# Patient Record
Sex: MALE | Race: WHITE
[De-identification: names, ages, dates, MRNs, and addresses within clinical notes are randomized per-mention and may not be internally consistent; named-entity substitution may affect disease eponyms.]

---

## 2018-11-13 ENCOUNTER — HOSPITAL ENCOUNTER (INPATIENT)
Dept: HOSPITAL 74 - JSAMEDAYSX | Age: 48
LOS: 8 days | Discharge: HOME | DRG: 454 | End: 2018-11-21
Attending: NURSE PRACTITIONER | Admitting: NEUROLOGICAL SURGERY
Payer: COMMERCIAL

## 2018-11-13 VITALS — BODY MASS INDEX: 41.5 KG/M2

## 2018-11-13 DIAGNOSIS — K59.03: ICD-10-CM

## 2018-11-13 DIAGNOSIS — M25.511: ICD-10-CM

## 2018-11-13 DIAGNOSIS — M54.9: ICD-10-CM

## 2018-11-13 DIAGNOSIS — E66.01: ICD-10-CM

## 2018-11-13 DIAGNOSIS — M40.204: Primary | ICD-10-CM

## 2018-11-13 DIAGNOSIS — M48.04: ICD-10-CM

## 2018-11-13 DIAGNOSIS — G89.29: ICD-10-CM

## 2018-11-13 DIAGNOSIS — T40.2X5A: ICD-10-CM

## 2018-11-13 DIAGNOSIS — M47.14: ICD-10-CM

## 2018-11-13 DIAGNOSIS — M47.814: ICD-10-CM

## 2018-11-13 RX ADMIN — LORAZEPAM PRN MG: 2 INJECTION INTRAMUSCULAR; INTRAVENOUS at 16:35

## 2018-11-13 RX ADMIN — DOCUSATE SODIUM SCH MG: 100 CAPSULE, LIQUID FILLED ORAL at 22:56

## 2018-11-13 RX ADMIN — HEPARIN SODIUM SCH UNIT: 5000 INJECTION, SOLUTION INTRAVENOUS; SUBCUTANEOUS at 22:56

## 2018-11-13 RX ADMIN — HYDROMORPHONE HYDROCHLORIDE SCH MG: 10 INJECTION, SOLUTION INTRAMUSCULAR; INTRAVENOUS; SUBCUTANEOUS at 17:00

## 2018-11-13 RX ADMIN — LORAZEPAM PRN MG: 2 INJECTION INTRAMUSCULAR; INTRAVENOUS at 16:45

## 2018-11-13 NOTE — HP
Language line was used for the following H&P #[id number]





CHIEF COMPLAINT:  Lumbar back pain, Right LE pain and paresthesias/weakness.





PCP: Jorge Daniels





HISTORY OF PRESENT ILLNESS: 47yo male c/o Chronic progressive back pain x 10 

years presents for elective surgery  Patient had thoracic (T3-T5) and lumbar (L2

-L5) fusions @ 8 years ago with Dr. Yasir Perea and was in relatively good 

health but he works in construction and states he has had chronic problems with 

his right leg that has been progressive over the years. He describes episodes 

of intermittent paresthesias and worsening weakness resulting in falls. He 

denies any bladder of bowel dysfunction.








Recent Travel: none





PAST MEDICAL HISTORY: none reported





PAST SURGICAL HISTORY:


T3-T 5 Fusion


L2-L5 Fusion





Social History:


Smoking:none


Alcohol:social


Drugs: none








Allergies





No Known Allergies Allergy (Verified 11/13/18 06:31)


 








HOME MEDICATIONS:


 Home Medications











 Medication  Instructions  Recorded


 


NK [No Known Home Medication]  11/08/18








REVIEW OF SYSTEMS


CONSTITUTIONAL: 


Absent:  fever, chills, diaphoresis, generalized weakness, malaise, 


HEENT: 


Absent:  rhinorrhea, nasal congestion, throat pain, throat swelling, difficulty 

swallowing, 


CARDIOVASCULAR: 


Absent: chest pain, syncope, palpitations, irregular heart rate,


RESPIRATORY: 


Absent: cough, shortness of breath, dyspnea with exertion, orthopnea, 


GASTROINTESTINAL:


Absent: abdominal pain, abdominal distension, nausea, vomiting, diarrhea, 

constipation,


GENITOURINARY: 


Absent: dysuria, frequency, urgency, hesitancy


MUSCULOSKELETAL: 


Absent: myalgia, arthralgia, joint swelling, 


SKIN: 


Absent: rash, itching, pallor


HEMATOLOGIC/IMMUNOLOGIC: 


Absent: easy bleeding, easy bruising, 


ENDOCRINE:


Absent:  unexplained weight loss, heat intolerance, cold intolerance


NEUROLOGIC: 


Absent: headache, + Right LE weakness and  paresthesias, dizziness, unsteady 

gait, bladder or bowel incontinence


PSYCHIATRIC: 


Absent: anxiety, depression, suicidal or homicidal ideation, hallucinations.








PHYSICAL EXAMINATION


 Vital Signs - 24 hr











  11/13/18 11/13/18





  06:38 06:39


 


Temperature  98.2 F


 


Pulse Rate  71


 


Respiratory  16





Rate  


 


Blood Pressure  125/79


 


O2 Sat by Pulse 99 





Oximetry (%)  











GENERAL: Awake, alert, and fully oriented, in no acute distress.


HEAD: Normal with no signs of trauma.


EYES:  extraocular movements intact, sclera anicteric, conjunctiva clear. No 

lid lag.


EARS, NOSE, THROAT: Ears normal, nares patent, oropharynx clear without 

exudates. Moist mucous membranes.


NECK:  supple without lymphadenopathy, JVD, or masses.


LUNGS: Breath sounds equal, clear to auscultation bilaterally. No wheezes, and 

no crackles. No accessory muscle use.


HEART:  normal S1 and S2 without murmur, rub or gallop.


ABDOMEN: Obese, Soft, nontender, not distended, normoactive bowel sounds,  


MUSCULOSKELETAL: No bony deformities or tenderness. No CVA tenderness.


UPPER EXTREMITIES: 2+ pulses, warm, well-perfused. No cyanosis. No clubbing. No 

peripheral edema.


LOWER EXTREMITIES: 2+ pulses, warm, well-perfused. No calf tenderness. No 

peripheral edema, 5/5 dorsi/plantar flexion. Negative SLE B/L 


NEUROLOGICAL:  Cranial nerves II-XII intact. Normal speech. 


PSYCHIATRIC: Cooperative. Good eye contact. Appropriate mood and affect.


SKIN: Warm, dry, normal turgor, no rashes or lesions noted, normal capillary 

refill. 


 Laboratory Results - last 24 hr











  11/13/18





  06:16


 


Blood Type  O POSITIVE


 


Antibody Screen  Negative








MRI and CT scans reviewed by Dr Douglas in office reveal persist Thoracic 

spondylosis, calcified PLL and disc as as well as facet hypertrophy and coronal 

deformity. 





ASSESSMENT/PLAN: As discussed with Dr Douglas in detail during pre-op visit on 

10/31/18. 





DX:Thoracic splondylosis and Kyphosis





Plan: Exploration of spinal fusion, removal or hardware and T3-T9 decompression 

(re-operative laminectomies and T3-T5) with multilevel transpedicle/

costovertebral decompressoin T67, T78, T89, Including complete decompression 

behind T7, osteotomies, and T3-T9 posterior instrumented fusion and deformity 

correction 





The R/B/A were discussed at length with the patient and his family and all 

questions were answered. The patient elected to proceed with the above 

mentioned procedure.





Patient pre operative testing and clearance was preformed by Dr Lam- 

please see chart





Problem List





- Problem


(1) Thoracic spondylosis


Assessment/Plan: 


Patient denies any changes in health/ history since his pre-op visit with Dr Lam. Will proceed with the surgery as planned.


Code(s): M47.814 - SPONDYLOSIS W/O MYELOPATHY OR RADICULOPATHY, THORACIC REGION

   





Visit type





- Emergency Visit


Emergency Visit: No





- New Patient


This patient is new to me today: Yes


Date on this admission: 11/13/18





- Critical Care


Critical Care patient: No

## 2018-11-14 LAB
ALBUMIN SERPL-MCNC: 3.2 G/DL (ref 3.4–5)
ALP SERPL-CCNC: 85 U/L (ref 45–117)
ALT SERPL-CCNC: 57 U/L (ref 13–61)
ANION GAP SERPL CALC-SCNC: 7 MMOL/L (ref 8–16)
AST SERPL-CCNC: 134 U/L (ref 15–37)
BILIRUB SERPL-MCNC: 0.6 MG/DL (ref 0.2–1)
BUN SERPL-MCNC: 15 MG/DL (ref 7–18)
CALCIUM SERPL-MCNC: 7.8 MG/DL (ref 8.5–10.1)
CHLORIDE SERPL-SCNC: 100 MMOL/L (ref 98–107)
CO2 SERPL-SCNC: 30 MMOL/L (ref 21–32)
CREAT SERPL-MCNC: 0.7 MG/DL (ref 0.55–1.3)
DEPRECATED RDW RBC AUTO: 13.7 % (ref 11.9–15.9)
GLUCOSE SERPL-MCNC: 102 MG/DL (ref 74–106)
HCT VFR BLD CALC: 31.1 % (ref 35.4–49)
HGB BLD-MCNC: 10.5 GM/DL (ref 11.7–16.9)
MAGNESIUM SERPL-MCNC: 1.9 MG/DL (ref 1.8–2.4)
MCH RBC QN AUTO: 30.2 PG (ref 25.7–33.7)
MCHC RBC AUTO-ENTMCNC: 33.6 G/DL (ref 32–35.9)
MCV RBC: 89.7 FL (ref 80–96)
PLATELET # BLD AUTO: 133 K/MM3 (ref 134–434)
PMV BLD: 10.8 FL (ref 7.5–11.1)
POTASSIUM SERPLBLD-SCNC: 4.2 MMOL/L (ref 3.5–5.1)
PROT SERPL-MCNC: 5.8 G/DL (ref 6.4–8.2)
RBC # BLD AUTO: 3.47 M/MM3 (ref 4–5.6)
SODIUM SERPL-SCNC: 137 MMOL/L (ref 136–145)
WBC # BLD AUTO: 7.8 K/MM3 (ref 4–10)

## 2018-11-14 PROCEDURE — 0RG70AJ FUSION OF 2 TO 7 THORACIC VERTEBRAL JOINTS WITH INTERBODY FUSION DEVICE, POSTERIOR APPROACH, ANTERIOR COLUMN, OPEN APPROACH: ICD-10-PCS | Performed by: NEUROLOGICAL SURGERY

## 2018-11-14 PROCEDURE — 0HB6XZZ EXCISION OF BACK SKIN, EXTERNAL APPROACH: ICD-10-PCS | Performed by: NEUROLOGICAL SURGERY

## 2018-11-14 PROCEDURE — B01BZZZ FLUOROSCOPY OF SPINAL CORD: ICD-10-PCS | Performed by: NEUROLOGICAL SURGERY

## 2018-11-14 PROCEDURE — 0RP60AZ REMOVAL OF INTERBODY FUSION DEVICE FROM THORACIC VERTEBRAL JOINT, OPEN APPROACH: ICD-10-PCS | Performed by: NEUROLOGICAL SURGERY

## 2018-11-14 PROCEDURE — 0RG7071 FUSION OF 2 TO 7 THORACIC VERTEBRAL JOINTS WITH AUTOLOGOUS TISSUE SUBSTITUTE, POSTERIOR APPROACH, POSTERIOR COLUMN, OPEN APPROACH: ICD-10-PCS | Performed by: NEUROLOGICAL SURGERY

## 2018-11-14 PROCEDURE — 0JX70ZC TRANSFER BACK SUBCUTANEOUS TISSUE AND FASCIA WITH SKIN, SUBCUTANEOUS TISSUE AND FASCIA, OPEN APPROACH: ICD-10-PCS | Performed by: NEUROLOGICAL SURGERY

## 2018-11-14 RX ADMIN — LIDOCAINE SCH PATCH: 50 PATCH TOPICAL at 10:22

## 2018-11-14 RX ADMIN — SODIUM CHLORIDE, POTASSIUM CHLORIDE, SODIUM LACTATE AND CALCIUM CHLORIDE SCH MLS/HR: 600; 310; 30; 20 INJECTION, SOLUTION INTRAVENOUS at 17:55

## 2018-11-14 RX ADMIN — HEPARIN SODIUM SCH UNIT: 5000 INJECTION, SOLUTION INTRAVENOUS; SUBCUTANEOUS at 05:47

## 2018-11-14 RX ADMIN — ACETAMINOPHEN PRN MG: 325 TABLET ORAL at 14:04

## 2018-11-14 RX ADMIN — HEPARIN SODIUM SCH UNIT: 5000 INJECTION, SOLUTION INTRAVENOUS; SUBCUTANEOUS at 21:57

## 2018-11-14 RX ADMIN — PANTOPRAZOLE SODIUM SCH MG: 40 TABLET, DELAYED RELEASE ORAL at 10:22

## 2018-11-14 RX ADMIN — DOCUSATE SODIUM SCH MG: 100 CAPSULE, LIQUID FILLED ORAL at 21:57

## 2018-11-14 RX ADMIN — CEFAZOLIN SCH MLS/HR: 1 INJECTION, POWDER, FOR SOLUTION INTRAVENOUS at 10:21

## 2018-11-14 RX ADMIN — DOCUSATE SODIUM SCH MG: 100 CAPSULE, LIQUID FILLED ORAL at 05:47

## 2018-11-14 RX ADMIN — CEFAZOLIN SCH: 1 INJECTION, POWDER, FOR SOLUTION INTRAVENOUS at 06:15

## 2018-11-14 RX ADMIN — HEPARIN SODIUM SCH UNIT: 5000 INJECTION, SOLUTION INTRAVENOUS; SUBCUTANEOUS at 14:04

## 2018-11-14 RX ADMIN — FOLIC ACID SCH MG: 1 TABLET ORAL at 10:22

## 2018-11-14 RX ADMIN — SIMETHICONE CHEW TAB 80 MG PRN MG: 80 TABLET ORAL at 14:05

## 2018-11-14 RX ADMIN — FERROUS SULFATE TAB EC 324 MG (65 MG FE EQUIVALENT) SCH MG: 324 (65 FE) TABLET DELAYED RESPONSE at 10:22

## 2018-11-14 RX ADMIN — CEFAZOLIN SCH MLS/HR: 1 INJECTION, POWDER, FOR SOLUTION INTRAVENOUS at 06:12

## 2018-11-14 RX ADMIN — POLYETHYLENE GLYCOL 3350 SCH GM: 17 POWDER, FOR SOLUTION ORAL at 10:23

## 2018-11-14 RX ADMIN — CEFAZOLIN SCH: 1 INJECTION, POWDER, FOR SOLUTION INTRAVENOUS at 10:21

## 2018-11-14 RX ADMIN — DOCUSATE SODIUM SCH MG: 100 CAPSULE, LIQUID FILLED ORAL at 14:05

## 2018-11-14 RX ADMIN — Medication SCH: at 21:57

## 2018-11-14 RX ADMIN — CEFAZOLIN SCH MLS/HR: 1 INJECTION, POWDER, FOR SOLUTION INTRAVENOUS at 17:56

## 2018-11-14 RX ADMIN — SODIUM CHLORIDE, POTASSIUM CHLORIDE, SODIUM LACTATE AND CALCIUM CHLORIDE SCH MLS/HR: 600; 310; 30; 20 INJECTION, SOLUTION INTRAVENOUS at 05:47

## 2018-11-14 RX ADMIN — HYDROMORPHONE HYDROCHLORIDE SCH MG: 10 INJECTION, SOLUTION INTRAMUSCULAR; INTRAVENOUS; SUBCUTANEOUS at 17:54

## 2018-11-14 NOTE — OP
Operative Note





- Note:


Operative Date: 11/13/18


Pre-Operative Diagnosis: splodylosis and kyphosis


Operation: Exploration of spinal fusion, removal or hardware and T3-T9 

decompression (re-operative laminectomies and T3-T5) with multilevel 

transpedicle/costovertebral decompressoin T67, T78, T89, Including complete 

decompression behind T7, osteotomies, and T3-T9 posterior instrumented fusion 

and deformity correction


Post-Operative Diagnosis: Same as Pre-op


Surgeon: Rios Douglas


Assistant: Iesha Horan


Anesthesiologist/CRNA: Niles Blakely


Anesthesia: General


Estimated Blood Loss (mls): 400


Drains & Tubes with Location: J/P right mid back


Drains, Volume Out (mls): 352 (camejo)


Fluid Volume Replaced (mls): 4,500

## 2018-11-14 NOTE — PN
Physical Exam: 


SUBJECTIVE: 





Patient seen and examined at the bedside.





Resting comfortably, c/o of right shoulder pain and tenderness.  also having 

abdominal pain/discomfort.


no nausea/no vomiting. not yet passing gas. last bm yesterday





OBJECTIVE:





for shoulder discomfort, will order lidoderm patch for localized pain/

tenderness.  no bruising noted.  patient can move his arm but limited secondary 

to pain.


for abdominal pain/discomfort. encourage pt to get oob to chair or ambulate 

with PT. simethicone ordered





patient given and shown how to use incentive spirometer, encouraged to use it 

hourly.





 Vital Signs











 Period  Temp  Pulse  Resp  BP Sys/Woody  Pulse Ox


 


 Last 24 Hr  97.9 F-98.9 F    14-22  126-164/74-97  








GENERAL: The patient is awake, alert, and fully oriented, in no acute distress.


HEAD: Normal with no signs of trauma.


EYES: PERRL, extraocular movements intact, sclera anicteric, conjunctiva clear. 

No ptosis. 


ENT: Ears normal, nares patent, oropharynx clear without exudates, moist mucous 

membranes.


NECK: Trachea midline, full range of motion, supple. 


LUNGS: Breath sounds equal, clear to auscultation bilaterally


HEART: Regular rate and rhythm


ABDOMEN: soft, mildly tender to touch, no nausea or vomiting.  monitor for now. 

encourage pt to get oob to chair.  minimize use of narcotics as tolerated.


EXTREMITIES: no edema. on SCDs, right arm tender to touch, likely secondary to 

prolonged surgery.  lidoderm patch applied.


NEUROLOGICAL:  Normal speech, gait not observed.


PSYCH: Normal mood, normal affect.


SKIN: Warm, dry, normal turgor, no rashes or lesions noted


 Laboratory Results - last 24 hr











  11/14/18 11/14/18 11/14/18





  06:30 06:30 08:34


 


WBC  7.8   Cancelled


 


Corrected WBC (auto)    Cancelled


 


RBC  3.47 L   Cancelled


 


Hgb  10.5 L   Cancelled


 


Hct  31.1 L D   Cancelled


 


MCV  89.7   Cancelled


 


MCH  30.2   Cancelled


 


MCHC  33.6   Cancelled


 


RDW  13.7   Cancelled


 


Plt Count  133 L D   Cancelled


 


MPV  10.8   Cancelled


 


Absolute Neuts (auto)    Cancelled


 


Neutrophils %    Cancelled


 


Lymphocytes %    Cancelled


 


Monocytes %    Cancelled


 


Eosinophils %    Cancelled


 


Basophils %    Cancelled


 


Nucleated RBC %    Cancelled


 


Platelet Estimate    Cancelled


 


Platelet Comment    Cancelled


 


Sodium   136 


 


Potassium   4.2 


 


Chloride   99 


 


Carbon Dioxide   30 


 


Anion Gap   8 


 


BUN   15 


 


Creatinine   0.6 


 


Creat Clearance w eGFR   > 60 


 


Random Glucose   99 


 


Calcium   7.6 L 


 


Magnesium   


 


Total Bilirubin   


 


AST   


 


ALT   


 


Alkaline Phosphatase   


 


Total Protein   


 


Albumin   














  11/14/18





  08:34


 


WBC 


 


Corrected WBC (auto) 


 


RBC 


 


Hgb 


 


Hct 


 


MCV 


 


MCH 


 


MCHC 


 


RDW 


 


Plt Count 


 


MPV 


 


Absolute Neuts (auto) 


 


Neutrophils % 


 


Lymphocytes % 


 


Monocytes % 


 


Eosinophils % 


 


Basophils % 


 


Nucleated RBC % 


 


Platelet Estimate 


 


Platelet Comment 


 


Sodium  Cancelled


 


Potassium  Cancelled


 


Chloride  Cancelled


 


Carbon Dioxide  Cancelled


 


Anion Gap  Cancelled


 


BUN  Cancelled


 


Creatinine  Cancelled


 


Creat Clearance w eGFR  Cancelled


 


Random Glucose  Cancelled


 


Calcium  Cancelled


 


Magnesium  Cancelled


 


Total Bilirubin  Cancelled


 


AST  Cancelled


 


ALT  Cancelled


 


Alkaline Phosphatase  Cancelled


 


Total Protein  Cancelled


 


Albumin  Cancelled








Active Medications











Generic Name Dose Route Start Last Admin





  Trade Name Freq  PRN Reason Stop Dose Admin


 


Dexamethasone Sodium Phosphate  4 mg  11/13/18 16:21  





  Decadron Injection -  IVPUSH   





  ONCE PRN   





  NAUSEA AND/OR VOMITING   





     





     





     


 


Diphenhydramine HCl  12.5 mg  11/13/18 16:21  





  Benadryl Injection -  IVPUSH   





  ONCE PRN   





  FOR ITCHING   





     





     





     


 


Diphenhydramine HCl  25 mg  11/13/18 16:33  





  Benadryl -  PO   





  Q6H PRN   





  FOR ITCHING   





     





     





     


 


Docusate Sodium  100 mg  11/13/18 22:00  11/14/18 05:47





  Colace -  PO   100 mg





  TID RENATO   Administration





     





     





     





     


 


Fentanyl  50 mcg  11/13/18 16:07  11/13/18 16:40





  Sublimaze Injection -  IVPUSH   50 mcg





  [id number] PRN   Administration





  PAIN-PACU ORDER X 4 DOSES ONLY   





     





     





     


 


Ferrous Sulfate  325 mg  11/14/18 10:00  





  Feosol -  PO   





  DAILY RENATO   





     





     





     





     


 


Folic Acid  1 mg  11/14/18 10:00  





  Folic Acid -  PO   





  DAILY RENATO   





     





     





     





     


 


Heparin Sodium (Porcine)  5,000 unit  11/13/18 22:00  11/14/18 05:47





  Heparin -  SQ   5,000 unit





  TID RENATO   Administration





     





     





     





     


 


Hydromorphone HCl  10 mg  11/13/18 16:30  11/13/18 17:00





  Dilaudid Pca -  PCA  11/20/18 16:21  10 mg





  PCA RENATO   Administration





     





     





  Protocol   





     


 


Hydromorphone HCl  2 mg  11/13/18 16:31  11/13/18 17:50





  Dilaudid Vial -  IVPUSH  11/14/18 16:30  0.4 mg





  ONCE PRN   Administration





  PAIN-PACU   





     





     





     


 


Cefazolin Sodium 1 gm/  50 mls @ 100 mls/hr  11/13/18 19:30  11/14/18 06:15





  Dextrose  IVPB   Not Given





  Q8H-IV RENATO   





     





     





     





     


 


Lactated Ringer's  1,000 ml in 1,000 mls @ 125 mls/hr  11/13/18 16:45  11/14/18 

05:47





  Lactated Ringers Solution  IV   125 mls/hr





  ASDIR RENATO   Administration





     





     





     





     


 


Influenza Virus Vaccine Quadrival  60 mcg  11/14/18 09:00  





  Flulaval Quad [id number]  IM  11/14/18 09:01  





  .ONCE ONE   





     





     





     





     


 


Lorazepam  2 mg  11/13/18 16:32  11/13/18 16:45





  Ativan Injection -  IVPUSH  11/14/18 16:31  1 mg





  ONCE PRN   Administration





  AGITATION   





     





     





     


 


Ondansetron HCl  4 mg  11/13/18 16:21  





  Zofran Injection  IVPUSH   





  Q4H PRN   





  NAUSEA AND/OR VOMITING   





     





     





     


 


Ondansetron HCl  4 mg  11/13/18 16:33  





  Zofran Injection  IVPUSH   





  Q6H PRN   





  NAUSEA   





     





     





     


 


Promethazine HCl  12.5 mg  11/13/18 16:21  





  Phenergan Injection -  IVPB   





  Q6H PRN   





  NAUSEA AND/OR VOMITING   





     





     





     


 


Simethicone  80 mg  11/14/18 08:48  





  Mylicon -  PO   





  Q4H PRN   





  GAS   





     





     





     











ASSESSMENT/PLAN:





Patient is a 48 year old male with a past medical history of chronic 

progressive back pain.  





He presented on 11/13/2018 for elective back surgery and is POD #1 of 

exploration of spinal fusion, removal or hardware and T3-T9 decompression (re-

operative laminectomies and T3-T5) with multilevel transpedicle/costovertebral 

decompressoin T67, T78, T89, complete decompression behind T7, osteotomies, and 

T3-T9 posterior instrumented fusion and deformity correction.





Surgery:


Back Surgery, POD#1


Post op monitoring


Monitor labs, vitals signs, pain scale


initiate bowel regimen


monitor drain output


pain management with dilaudid pca


incentive spirometer q1 hours


physical therapy





fen:


regular diet


monitor electrolytes


no ivf





prophy:


SCDs


protonix


simethicone





full code





Visit type





- Emergency Visit


Emergency Visit: Yes


ED Registration Date: 11/13/18


Care time: The patient presented to the Emergency Department on the above date 

and was hospitalized for further evaluation of their emergent condition.





- New Patient


This patient is new to me today: Yes


Date on this admission: 11/14/18





- Critical Care


Critical Care patient: No





- Discharge Referral


Referred to [facility] No

## 2018-11-14 NOTE — PN
Progress Note (short form)





- Note


Progress Note: 





Surgery





POD#1  Exploration of spinal fusion, removal or hardware and T3-T9 

decompression (re-operative laminectomies and T3-T5) with multilevel 

transpedicle/costovertebral decompressoin T67, T78, T89, Including complete 

decompression behind T7, osteotomies, and T3-T9 posterior instrumented fusion 

and deformity correction patient seen and examined at bedside. He c/o right 

shoulder discomfort and abdominal/gas pain.  He tolerated his clear diet and 

denies any nausea or vomiting. He denies any UE radiculopathy, new LE 

radiculopathy or paresthesias, Headache, fever, chills, CP, SOB, or blurred 

vision. He still has the camejo.





 Vital Signs











Temp  98.1 F   11/14/18 10:00


 


Pulse  92 H  11/14/18 10:00


 


Resp  18   11/14/18 10:00


 


BP  136/82   11/14/18 10:00


 


Pulse Ox  99   11/14/18 09:00








 Intake & Output











 11/13/18 11/14/18 11/14/18





 23:59 11:59 23:59


 


Intake Total 1175 380 


 


Output Total 1585 620 


 


Balance -410 -240 


 


Intake:   


 


  IV 1175  


 


    LACTATED RINGERS SOLUTION 375  





    1,000 ml In 1,000 ml @   





    125 mls/hr IV [facility]   





    Rx#:[id number]   


 


  Oral  380 


 


Output:   


 


  Drainage 285 220 


 


    Medial Back 110 220 


 


  Urine 1300 400 


 


    Camejo 600 400 


 


Other:   


 


  Voiding Method Indwelling Catheter Urinal 





                                                                               

                   CBC, BMP





 Abnormal Lab Results











  11/14/18 11/14/18





  06:30 06:30


 


RBC  3.47 L 


 


Hgb  10.5 L 


 


Hct  31.1 L D 


 


Plt Count  133 L D 


 


Anion Gap   7 L


 


Calcium   7.8 L


 


AST   134 H


 


Total Protein   5.8 L


 


Albumin   3.2 L














PE:


A&Ox3, NAD


Unlabored resp on RA


Right shoulder no evidence of erythema, edema or eccymosis. TTP over anterior 

aspect, active forward elevation to @90 degrees limited by pain blocking, PROM 

forward elevation to 120 degrees, mild ttp over a/c joint. PROM abduction to 

130 degrees with some pain blocking. no apprehension or laxity on exam. 


spine dressing c/d/i with aquacell dressing, flat with surrounding tissue intact

, no evidence of tracking erythema, edema, or collection. Drain securely in 

place with bloody d/c-330cc post op.


B/L LE Neg SLR, 5/5 quad strength, LE compartments soft, supple and non-tender 

with +2 DP pulsees, 5/5 dorsi/plantar flexion, sensation to light touch intact 

throughout.





Problem List





- Problems


(1) Thoracic spondylosis


Assessment/Plan: 


s/p T3-T9 lami/decompression and fusion. Patient doing well with right shoulder 

discomfort, stiffness, and gas pain possibly from length of anesthesia and 

positioning. 





1) d/c camejo with PVR, bladder scan.


2) OOB with TLSO brace adn PT


3) encourage ambulation and IS


4) Warm compress to right shoulder PRN encourage ROM


5) DVT prophylaxis


6) pain control





Evaluation and plan discussed with Dr Douglas





Code(s): M47.814 - SPONDYLOSIS W/O MYELOPATHY OR RADICULOPATHY, THORACIC REGION

## 2018-11-14 NOTE — PN
Progress Note (short form)





- Note


Progress Note: 





Post op day#1.S/P T3-T9 Removal of hardware with revision of decompression and 

fusion under GA uneventful.Patient stable and c/o pain score of 4-5/10 while 

resting on Dilaudid PCA.Will continue PCA today and will f/u tomorrow.

## 2018-11-15 LAB
ALBUMIN SERPL-MCNC: 3.3 G/DL (ref 3.4–5)
ALP SERPL-CCNC: 84 U/L (ref 45–117)
ALT SERPL-CCNC: 54 U/L (ref 13–61)
ANION GAP SERPL CALC-SCNC: 7 MMOL/L (ref 8–16)
AST SERPL-CCNC: 115 U/L (ref 15–37)
BASOPHILS # BLD: 0.5 % (ref 0–2)
BILIRUB SERPL-MCNC: 0.6 MG/DL (ref 0.2–1)
BUN SERPL-MCNC: 12 MG/DL (ref 7–18)
CALCIUM SERPL-MCNC: 7.9 MG/DL (ref 8.5–10.1)
CHLORIDE SERPL-SCNC: 100 MMOL/L (ref 98–107)
CO2 SERPL-SCNC: 29 MMOL/L (ref 21–32)
CREAT SERPL-MCNC: 0.8 MG/DL (ref 0.55–1.3)
DEPRECATED RDW RBC AUTO: 14 % (ref 11.9–15.9)
EOSINOPHIL # BLD: 0.3 % (ref 0–4.5)
GLUCOSE SERPL-MCNC: 132 MG/DL (ref 74–106)
HCT VFR BLD CALC: 30.8 % (ref 35.4–49)
HGB BLD-MCNC: 10.4 GM/DL (ref 11.7–16.9)
LYMPHOCYTES # BLD: 19.5 % (ref 8–40)
MAGNESIUM SERPL-MCNC: 2.2 MG/DL (ref 1.8–2.4)
MCH RBC QN AUTO: 30 PG (ref 25.7–33.7)
MCHC RBC AUTO-ENTMCNC: 33.8 G/DL (ref 32–35.9)
MCV RBC: 89 FL (ref 80–96)
MONOCYTES # BLD AUTO: 5.3 % (ref 3.8–10.2)
NEUTROPHILS # BLD: 74.4 % (ref 42.8–82.8)
PLATELET # BLD AUTO: 128 K/MM3 (ref 134–434)
PMV BLD: 9.7 FL (ref 7.5–11.1)
POTASSIUM SERPLBLD-SCNC: 3.7 MMOL/L (ref 3.5–5.1)
PROT SERPL-MCNC: 6.4 G/DL (ref 6.4–8.2)
RBC # BLD AUTO: 3.46 M/MM3 (ref 4–5.6)
SODIUM SERPL-SCNC: 135 MMOL/L (ref 136–145)
WBC # BLD AUTO: 7.5 K/MM3 (ref 4–10)

## 2018-11-15 RX ADMIN — SODIUM CHLORIDE, POTASSIUM CHLORIDE, SODIUM LACTATE AND CALCIUM CHLORIDE SCH: 600; 310; 30; 20 INJECTION, SOLUTION INTRAVENOUS at 17:56

## 2018-11-15 RX ADMIN — HEPARIN SODIUM SCH UNIT: 5000 INJECTION, SOLUTION INTRAVENOUS; SUBCUTANEOUS at 05:33

## 2018-11-15 RX ADMIN — HEPARIN SODIUM SCH UNIT: 5000 INJECTION, SOLUTION INTRAVENOUS; SUBCUTANEOUS at 22:13

## 2018-11-15 RX ADMIN — LIDOCAINE SCH PATCH: 50 PATCH TOPICAL at 09:51

## 2018-11-15 RX ADMIN — CEFAZOLIN SCH MLS/HR: 1 INJECTION, POWDER, FOR SOLUTION INTRAVENOUS at 02:19

## 2018-11-15 RX ADMIN — DOCUSATE SODIUM SCH MG: 100 CAPSULE, LIQUID FILLED ORAL at 22:13

## 2018-11-15 RX ADMIN — Medication SCH EACH: at 22:13

## 2018-11-15 RX ADMIN — POLYETHYLENE GLYCOL 3350 SCH GM: 17 POWDER, FOR SOLUTION ORAL at 09:54

## 2018-11-15 RX ADMIN — SIMETHICONE CHEW TAB 80 MG PRN MG: 80 TABLET ORAL at 05:36

## 2018-11-15 RX ADMIN — CEFAZOLIN SCH MLS/HR: 1 INJECTION, POWDER, FOR SOLUTION INTRAVENOUS at 09:52

## 2018-11-15 RX ADMIN — DOCUSATE SODIUM SCH MG: 100 CAPSULE, LIQUID FILLED ORAL at 05:33

## 2018-11-15 RX ADMIN — FERROUS SULFATE TAB EC 324 MG (65 MG FE EQUIVALENT) SCH MG: 324 (65 FE) TABLET DELAYED RESPONSE at 09:52

## 2018-11-15 RX ADMIN — HYDROMORPHONE HYDROCHLORIDE SCH: 10 INJECTION, SOLUTION INTRAMUSCULAR; INTRAVENOUS; SUBCUTANEOUS at 17:57

## 2018-11-15 RX ADMIN — FOLIC ACID SCH MG: 1 TABLET ORAL at 09:52

## 2018-11-15 RX ADMIN — CEFAZOLIN SCH MLS/HR: 1 INJECTION, POWDER, FOR SOLUTION INTRAVENOUS at 17:56

## 2018-11-15 RX ADMIN — PANTOPRAZOLE SODIUM SCH MG: 40 TABLET, DELAYED RELEASE ORAL at 09:52

## 2018-11-15 RX ADMIN — SIMETHICONE CHEW TAB 80 MG PRN MG: 80 TABLET ORAL at 22:23

## 2018-11-15 RX ADMIN — HEPARIN SODIUM SCH UNIT: 5000 INJECTION, SOLUTION INTRAVENOUS; SUBCUTANEOUS at 15:34

## 2018-11-15 RX ADMIN — SIMETHICONE CHEW TAB 80 MG PRN MG: 80 TABLET ORAL at 12:25

## 2018-11-15 RX ADMIN — DOCUSATE SODIUM SCH MG: 100 CAPSULE, LIQUID FILLED ORAL at 15:34

## 2018-11-15 RX ADMIN — HYDROMORPHONE HYDROCHLORIDE SCH: 10 INJECTION, SOLUTION INTRAMUSCULAR; INTRAVENOUS; SUBCUTANEOUS at 11:44

## 2018-11-15 NOTE — PN
Progress Note (short form)





- Note


Progress Note: 





Anesthesia/pain


Pt seen and examined


S:Alert and awake mild discomfort


O:


 Vital Signs











Temperature  98.3 F   11/15/18 06:47


 


Pulse Rate  91 H  11/15/18 06:47


 


Respiratory Rate  20   11/15/18 06:47


 


Blood Pressure  130/65   11/15/18 06:47


 


O2 Sat by Pulse Oximetry (%)  99   11/14/18 21:00








 CBC, BMP





 11/15/18 09:15 





 11/15/18 09:15 








A/P


Current Active Problems





Thoracic spondylosis (Acute)








s/pT3-T9 removal of hardware/correction and fusion


Doing well post op


Continue current care


Continue PCA


Antoni Wade MD

## 2018-11-15 NOTE — PN
Progress Note (short form)





- Note


Progress Note: 


48M s/p T3-T9 fusion, POD 2.  Pt seen and examined at bedside.  Pt states that 

his back pain is well controlled.  Denies weakness or numbness.  Pt only 

complaining of some diffuse abd pain.  States has not had BM since Monday.  

Denies fever, chills, n/v.  





 Last Vital Signs











Temp Pulse Resp BP Pulse Ox


 


 98.3 F   91 H  20   130/65   99 


 


 11/15/18 06:47  11/15/18 06:47  11/15/18 06:47  11/15/18 06:47  11/14/18 21:00








 CBC, BMP





 11/15/18 09:15 





 11/15/18 09:15 








PE:


Gen: A&O X3


Resp: breathing comfortably


Abd: soft, mild distension, mild diffuse tenderness


Back: incision clean and dry, no erythema.  Drain in place with serosanguinous 

drainage. 


Output: 320ml


Neuro: no weakness, or numbness. Strength 5/5





<Og Nunes - Last Filed: 11/15/18 12:17>





- Note


Progress Note: 





Agree with above. Right Shoulder significantly improved.


-Laxatives to help with BM


-encouraged PO intake


-OOB and ambulate with assist/PT


-GI/DVT prophylaxis


-continue TEENA


-wean PCA/narcotics


-Ortho consult appreciated





<Rios Douglas - Last Filed: 11/15/18 14:11>





Problem List





- Problems


(1) Thoracic spondylosis


Assessment/Plan: 


Plan


  -abd pain most likely due to constipation, consider increasing bowel regiment 

and d/c PCA


  -appreciate med recs for abd pain


  -OOB/ambulate with PT


  -dvt ppx


  -continue monitor drain output


  


Code(s): M47.814 - SPONDYLOSIS W/O MYELOPATHY OR RADICULOPATHY, THORACIC REGION

   





<Og Nunes - Last Filed: 11/15/18 12:17>

## 2018-11-15 NOTE — PN
Physical Exam: 


SUBJECTIVE: 





Patient seen at the bedside.  family present.


sitting in chair.  c/o of constipation and bloating, no nausea or vomiting.





OBJECTIVE:


right shoulder pain improved with lidoderm patch.  seen by ortho.


constipation: ordered dulcolax po and dulcolax pr.  if not working will give 

mag citrate.





 Vital Signs











 Period  Temp  Pulse  Resp  BP Sys/Woody  Pulse Ox


 


 Last 24 Hr  98 F-99.2 F    18-20  121-140/65-84  99








GENERAL: The patient is awake, alert, and fully oriented, in no acute distress.


HEAD: Normal with no signs of trauma.


EYES: PERRL, extraocular movements intact, sclera anicteric, conjunctiva clear. 

No ptosis. 


ENT: Ears normal, nares patent, oropharynx clear without exudates, moist mucous 

membranes.


NECK: Trachea midline, full range of motion, supple. 


LUNGS: Breath sounds equal, clear to auscultation bilaterally


HEART: Regular rate and rhythm


ABDOMEN: soft, mildly tender to touch, no nausea or vomiting.  monitor for now. 

encourage pt to get oob to chair.  minimize use of narcotics as tolerated.


EXTREMITIES: no edema. on SCDs, right arm tender to touch, likely secondary to 

prolonged surgery.  lidoderm patch applied.


NEUROLOGICAL:  Normal speech, gait not observed.


PSYCH: Normal mood, normal affect.


SKIN: Warm, dry, normal turgor, no rashes or lesions noted


 Laboratory Results - last 24 hr











  11/14/18 11/15/18 11/15/18





  06:30 09:15 09:15


 


WBC   7.5 


 


RBC   3.46 L 


 


Hgb   10.4 L 


 


Hct   30.8 L 


 


MCV   89.0 


 


MCH   30.0 


 


MCHC   33.8 


 


RDW   14.0 


 


Plt Count   128 L 


 


MPV   9.7  D 


 


Absolute Neuts (auto)   5.5 


 


Total Counted  100  


 


Neutrophils %  No Result Required.  74.4 


 


Neutrophils % (Manual)  73.0  


 


Band Neutrophils %  2.0  


 


Lymphocytes %  No Result Required.  19.5  D 


 


Lymphocytes % (Manual)  19.0  


 


Monocytes %   5.3 


 


Monocytes % (Manual)  6  


 


Eosinophils %   0.3  D 


 


Basophils %   0.5 


 


Nucleated RBC %   0 


 


Sodium    135 L


 


Potassium    3.7


 


Chloride    100


 


Carbon Dioxide    29


 


Anion Gap    7 L


 


BUN    12


 


Creatinine    0.8


 


Creat Clearance w eGFR    > 60


 


Random Glucose    132 H


 


Calcium    7.9 L


 


Magnesium    2.2


 


Total Bilirubin    0.6


 


AST    115 H


 


ALT    54


 


Alkaline Phosphatase    84


 


Total Protein    6.4


 


Albumin    3.3 L








Active Medications











Generic Name Dose Route Start Last Admin





  Trade Name Ham  PRN Reason Stop Dose Admin


 


Acetaminophen  650 mg  11/14/18 09:26  11/14/18 14:04





  Tylenol -  PO   650 mg





  Q6H PRN   Administration





  PAIN LEVEL 4 - 6   





     





     





     


 


Dexamethasone Sodium Phosphate  4 mg  11/13/18 16:21  





  Decadron Injection -  IVPUSH   





  ONCE PRN   





  NAUSEA AND/OR VOMITING   





     





     





     


 


Diphenhydramine HCl  12.5 mg  11/13/18 16:21  





  Benadryl Injection -  IVPUSH   





  ONCE PRN   





  FOR ITCHING   





     





     





     


 


Diphenhydramine HCl  25 mg  11/13/18 16:33  





  Benadryl -  PO   





  Q6H PRN   





  FOR ITCHING   





     





     





     


 


Docusate Sodium  100 mg  11/13/18 22:00  11/15/18 05:33





  Colace -  PO   100 mg





  TID RENATO   Administration





     





     





     





     


 


Fentanyl  50 mcg  11/13/18 16:07  11/13/18 16:40





  Sublimaze Injection -  IVPUSH   50 mcg





  [id number] PRN   Administration





  PAIN-PACU ORDER X 4 DOSES ONLY   





     





     





     


 


Ferrous Sulfate  325 mg  11/14/18 10:00  11/15/18 09:52





  Feosol -  PO   325 mg





  DAILY RENATO   Administration





     





     





     





     


 


Folic Acid  1 mg  11/14/18 10:00  11/15/18 09:52





  Folic Acid -  PO   1 mg





  DAILY RENATO   Administration





     





     





     





     


 


Heparin Sodium (Porcine)  5,000 unit  11/13/18 22:00  11/15/18 05:33





  Heparin -  SQ   5,000 unit





  TID RENATO   Administration





     





     





     





     


 


Hydromorphone HCl  10 mg  11/13/18 16:30  11/15/18 11:44





  Dilaudid Pca -  PCA  11/20/18 16:21  Not Given





  PCA [facility]   





     





     





  Protocol   





     


 


Cefazolin Sodium 1 gm/  50 mls @ 100 mls/hr  11/13/18 19:30  11/15/18 09:52





  Dextrose  IVPB   100 mls/hr





  Q8H-IV RENATO   Administration





     





     





     





     


 


Lactated Ringer's  1,000 ml in 1,000 mls @ 125 mls/hr  11/13/18 16:45  11/14/18 

17:55





  Lactated Ringers Solution  IV   125 mls/hr





  ASDIR RENATO   Administration





     





     





     





     


 


Lidocaine  1 patch  11/14/18 10:00  11/15/18 09:51





  Lidoderm Patch -  TP   1 patch





  DAILY RENATO   Administration





     





     





     





     


 


Miscellaneous  1 each  11/14/18 22:00  11/14/18 21:57





  Lidoderm Patch Removal  MC   Not Given





  DAILY@2200 RENATO   





     





     





     





     


 


Ondansetron HCl  4 mg  11/13/18 16:21  





  Zofran Injection  IVPUSH   





  Q4H PRN   





  NAUSEA AND/OR VOMITING   





     





     





     


 


Ondansetron HCl  4 mg  11/13/18 16:33  





  Zofran Injection  IVPUSH   





  Q6H PRN   





  NAUSEA   





     





     





     


 


Pantoprazole Sodium  40 mg  11/14/18 10:00  11/15/18 09:52





  Protonix -  PO   40 mg





  DAILY RENATO   Administration





     





     





     





     


 


Polyethylene Glycol  17 gm  11/14/18 10:00  11/15/18 09:54





  Miralax (For Daily Use) -  PO   17 gm





  DAILY RENATO   Administration





     





     





     





     


 


Promethazine HCl  12.5 mg  11/13/18 16:21  





  Phenergan Injection -  IVPB   





  Q6H PRN   





  NAUSEA AND/OR VOMITING   





     





     





     


 


Simethicone  80 mg  11/14/18 08:48  11/15/18 05:36





  Mylicon -  PO   80 mg





  Q4H PRN   Administration





  GAS   





     





     





     








ASSESSMENT/PLAN:





Patient is a 48 year old male with a past medical history of chronic 

progressive back pain.  





He presented on 11/13/2018 for elective back surgery and is POD #2 of 

exploration of spinal fusion, removal or hardware and T3-T9 decompression (re-

operative laminectomies and T3-T5) with multilevel transpedicle/costovertebral 

decompressoin T67, T78, T89, complete decompression behind T7, osteotomies, and 

T3-T9 posterior instrumented fusion and deformity correction.





Surgery:


Back Surgery, POD#2


Post op monitoring, vitals and labs stable.


c/o of constipation.  dulcolax po and pr ordered.  will give one dose of mag 

citrate.


pain management with dilaudid pca, consider transitioning off pca to oral pain 

meds.


incentive spirometer q1 hours


physical therapy





Right shoulder pain, improved


lidoderm applied to right upper shoulder, pt states pain improved.  seen by 

ortho, no new interventions at this time.


encourage PT with upper arm ROM exercises.





fen:


regular diet


monitor electrolytes


no ivf





prophy:


SCDs


protonix


simethicone





full code





Visit type





- Emergency Visit


Emergency Visit: Yes


ED Registration Date: 11/13/18


Care time: The patient presented to the Emergency Department on the above date 

and was hospitalized for further evaluation of their emergent condition.





- New Patient


This patient is new to me today: No





- Critical Care


Critical Care patient: No





- Discharge Referral


Referred to [facility] No

## 2018-11-15 NOTE — CON.ORTH
Consult


Reason for Consultation:: right shoulder pain s/p revision thoracic spine 

surgery





- Alcohol/Substance Use


Hx Alcohol Use: Yes (social)





- Smoking History


Smoking history: Never smoked





Home Medications





- Allergies


Allergies/Adverse Reactions: 


 Allergies











Allergy/AdvReac Type Severity Reaction Status Date / Time


 


No Known Allergies Allergy   Verified 11/13/18 06:31














- Home Medications


Home Medications: 


Ambulatory Orders





NK [No Known Home Medication]  11/08/18 











Physical Exam for Ortho


Vital Signs: 


 Vital Signs











Temperature  98.3 F   11/15/18 06:47


 


Pulse Rate  91 H  11/15/18 06:47


 


Respiratory Rate  20   11/15/18 06:47


 


Blood Pressure  130/65   11/15/18 06:47


 


O2 Sat by Pulse Oximetry (%)  99   11/14/18 21:00











Labs: 


 CBC, BMP





 11/14/18 08:34 





 11/14/18 08:34 











- Upper Extremity


Shoulder: Yes: Right, Other (mild ttp, ROM , ER 40, IR L5, neg empty can, 

- neer,-booth, - obriens, -speeds/yerg, strength 5/5, nvi)





Assessment/Plan





47 yo male s/p Exploration of spinal fusion, removal or hardware and T3-T9 

decompression (re-operative laminectomies and T3-T5) with multilevel 

transpedicle/costovertebral decompressoin T67, T78, T89, Including complete 

decompression behind T7, osteotomies, and T3-T9 posterior instrumented fusion 

and deformity correction. c/o pain in right shoulder since the operation. Pt 

states that the pain is much improved today. Denies any h/o shoulder pain prior 

to the operation. Given complexity of the operation, pt was supine for a long 

period of time.





a/p right shoulder pain- positional 


d/w with pt in detail, shoulder pain is much improved


NTD orthopedically


ROM exercises


Pt can f/u as outpt if pain has not completely resolved over the next few weeks


d/w Dr. Johnston

## 2018-11-16 LAB
ALBUMIN SERPL-MCNC: 3.1 G/DL (ref 3.4–5)
ALP SERPL-CCNC: 77 U/L (ref 45–117)
ALT SERPL-CCNC: 46 U/L (ref 13–61)
ANION GAP SERPL CALC-SCNC: 9 MMOL/L (ref 8–16)
AST SERPL-CCNC: 71 U/L (ref 15–37)
BASOPHILS # BLD: 0.4 % (ref 0–2)
BILIRUB SERPL-MCNC: 0.5 MG/DL (ref 0.2–1)
BUN SERPL-MCNC: 13 MG/DL (ref 7–18)
CALCIUM SERPL-MCNC: 8 MG/DL (ref 8.5–10.1)
CHLORIDE SERPL-SCNC: 98 MMOL/L (ref 98–107)
CO2 SERPL-SCNC: 28 MMOL/L (ref 21–32)
CREAT SERPL-MCNC: 0.8 MG/DL (ref 0.55–1.3)
DEPRECATED RDW RBC AUTO: 14.2 % (ref 11.9–15.9)
EOSINOPHIL # BLD: 1.3 % (ref 0–4.5)
GLUCOSE SERPL-MCNC: 101 MG/DL (ref 74–106)
HCT VFR BLD CALC: 30.4 % (ref 35.4–49)
HGB BLD-MCNC: 10.1 GM/DL (ref 11.7–16.9)
LYMPHOCYTES # BLD: 27.3 % (ref 8–40)
MAGNESIUM SERPL-MCNC: 2.2 MG/DL (ref 1.8–2.4)
MCH RBC QN AUTO: 29.6 PG (ref 25.7–33.7)
MCHC RBC AUTO-ENTMCNC: 33.1 G/DL (ref 32–35.9)
MCV RBC: 89.4 FL (ref 80–96)
MONOCYTES # BLD AUTO: 7.7 % (ref 3.8–10.2)
NEUTROPHILS # BLD: 63.3 % (ref 42.8–82.8)
PLATELET # BLD AUTO: 129 K/MM3 (ref 134–434)
PMV BLD: 9.9 FL (ref 7.5–11.1)
POTASSIUM SERPLBLD-SCNC: 3.5 MMOL/L (ref 3.5–5.1)
PROT SERPL-MCNC: 6.5 G/DL (ref 6.4–8.2)
RBC # BLD AUTO: 3.4 M/MM3 (ref 4–5.6)
SODIUM SERPL-SCNC: 135 MMOL/L (ref 136–145)
WBC # BLD AUTO: 6.5 K/MM3 (ref 4–10)

## 2018-11-16 RX ADMIN — HEPARIN SODIUM SCH UNIT: 5000 INJECTION, SOLUTION INTRAVENOUS; SUBCUTANEOUS at 05:31

## 2018-11-16 RX ADMIN — HEPARIN SODIUM SCH UNIT: 5000 INJECTION, SOLUTION INTRAVENOUS; SUBCUTANEOUS at 21:16

## 2018-11-16 RX ADMIN — PANTOPRAZOLE SODIUM SCH MG: 40 TABLET, DELAYED RELEASE ORAL at 10:13

## 2018-11-16 RX ADMIN — CEFAZOLIN SCH MLS/HR: 1 INJECTION, POWDER, FOR SOLUTION INTRAVENOUS at 10:13

## 2018-11-16 RX ADMIN — SODIUM CHLORIDE, POTASSIUM CHLORIDE, SODIUM LACTATE AND CALCIUM CHLORIDE SCH: 600; 310; 30; 20 INJECTION, SOLUTION INTRAVENOUS at 17:21

## 2018-11-16 RX ADMIN — FOLIC ACID SCH MG: 1 TABLET ORAL at 15:41

## 2018-11-16 RX ADMIN — FERROUS SULFATE TAB EC 324 MG (65 MG FE EQUIVALENT) SCH MG: 324 (65 FE) TABLET DELAYED RESPONSE at 10:13

## 2018-11-16 RX ADMIN — POLYETHYLENE GLYCOL 3350 SCH GM: 17 POWDER, FOR SOLUTION ORAL at 10:13

## 2018-11-16 RX ADMIN — DOCUSATE SODIUM SCH MG: 100 CAPSULE, LIQUID FILLED ORAL at 15:42

## 2018-11-16 RX ADMIN — CEFAZOLIN SCH MLS/HR: 1 INJECTION, POWDER, FOR SOLUTION INTRAVENOUS at 18:35

## 2018-11-16 RX ADMIN — HEPARIN SODIUM SCH UNIT: 5000 INJECTION, SOLUTION INTRAVENOUS; SUBCUTANEOUS at 15:42

## 2018-11-16 RX ADMIN — CEFAZOLIN SCH MLS/HR: 1 INJECTION, POWDER, FOR SOLUTION INTRAVENOUS at 01:30

## 2018-11-16 RX ADMIN — Medication SCH EACH: at 21:18

## 2018-11-16 RX ADMIN — LIDOCAINE SCH PATCH: 50 PATCH TOPICAL at 10:13

## 2018-11-16 RX ADMIN — DOCUSATE SODIUM SCH MG: 100 CAPSULE, LIQUID FILLED ORAL at 21:16

## 2018-11-16 RX ADMIN — DOCUSATE SODIUM SCH MG: 100 CAPSULE, LIQUID FILLED ORAL at 05:31

## 2018-11-16 NOTE — PN
Progress Note (short form)





- Note


Progress Note: 





Pain Follow up


Patient is using PCA, infrequently.


Eating well.


No N/V.


Pain is bearable.





A/P


Discontinue the PCA.





Laura Colby MD.

## 2018-11-16 NOTE — PATH
Surgical Pathology Report



Patient Name:  BROOKLYN JUARES

Accession #:  [id number]

Med. Rec. #:  [id number]                                                        

   /Age/Gender:  1970 (Age: 48) / M

Account:  [id number]                                                          

             Location: [facility] MED/SURG

Taken:  2018

Received:  2018

Reported:  2018

Physicians:  Rios Mcgarry M.D.

  



Specimen(s) Received

 FULL THICKNESS SKIN WIHT KELOID 





Clinical History

Thoracic spondylosis, kyphosis







Final Diagnosis

FULL THICKNESS OF SKIN WITH KELOID, EXCISION:

SEGMENT OF SKIN WITH KELOID. 





***Electronically Signed***

Douglas Leong M.D.





Gross Description

Received in formalin labeled "full thickness skin with keloid," is a 9.5 x 1.7

cm tan-brown, unoriented portion of skin excised to a depth of 2.4 cm. The

epidermal surface displays an 8.5 cm linear, hypertrophied scar, consistent with

a keloid. The base is inked green and the specimen is serially sectioned.

Representative sections are submitted in a 3 cassettes.

/11/15/2018



[facility]/11/15/2018

## 2018-11-16 NOTE — PN
Progress Note (short form)





- Note


Progress Note: 





Surgery





POD#3  Exploration of spinal fusion, removal or hardware and T3-T9 

decompression (re-operative laminectomies and T3-T5) with multilevel 

transpedicle/costovertebral decompressoin T67, T78, T89, Including complete 

decompression behind T7, osteotomies, and T3-T9 posterior instrumented fusion 

and deformity correction patient seen and examined at bedside. He says his 

right shoulder discomfort continues to improve. He still has lots of gas and 

abdominal pain but he has been eating, passing gas and had a normal BM 

yesterday. He denies any UE radiculopathy, new LE radiculopathy or paresthesias

, Headache, fever, chills, CP, SOB, or blurred vision. He is voiding without 

limitation and his post void bladder scan was negative for retained urine 

according to nursing.





 Vital Signs











Temp  98.7 F   11/16/18 10:00


 


Pulse  86   11/16/18 10:00


 


Resp  18   11/16/18 10:00


 


BP  119/69   11/16/18 10:00


 


Pulse Ox  99   11/14/18 21:00








 Intake & Output











 11/15/18 11/16/18 11/16/18





 23:59 11:59 23:59


 


Intake Total 650 450 380


 


Output Total 400 100 


 


Balance 250 350 380


 


Intake:   


 


    


 


    LACTATED RINGERS SOLUTION 450  





    1,000 ml In 1,000 ml @   





    125 mls/hr IV [facility]   





    Rx#:[id number]   


 


  IVPB 200 50 


 


  Oral  400 380


 


Output:   


 


  Drainage 400 100 


 


    Medial Back 400 100 


 


Other:   


 


  Voiding Method Toilet  Toilet








 CBC, BMP





 11/16/18 09:52 





 11/16/18 09:52 











PE:


A&Ox3, NAD


Unlabored resp on RA


b/l UE 5/5 strength at deltoids, biceps/triceps and gripping strength. 


ABD: Obese, distended with diffuse TTP throughout. 


b/l LE 5/5 on resisted quad testing 3/5 right hamstring vs 5/5 left hamstring, 5

/5 dorsi/plantar flexion. LE compartments soft, supple and non-tender with + DP 

pulses. 


spine incision c/d/i with dermabond (no staples)  flat with surrounding tissue 

intact, no evidence of tracking erythema, edema, or collection. Drain securely 

in place with SS d/c-700cc/24hr








Problem List





- Problems


(1) Thoracic spondylosis


Assessment/Plan: 


s/p T3-T9 lami/decompression and fusion. Patient doing well with continue gas 

pain, eating and moving bowels, no evidence of obstruction clinically. 





1) TEENA drain to bile bag- will encourage reduction in drainage


2) d/c PAC start oral pain meds


3) KUB x-ray to evaluate abdominal pain-likely constipation


4) Fleet enema, encourage po intake


5) OOB with TLSO brace adn PT


6) encourage ambulation and IS


7) DVT prophylaxis


8) pain control





Evaluation and plan discussed with Dr Douglas





Code(s): M47.814 - SPONDYLOSIS W/O MYELOPATHY OR RADICULOPATHY, THORACIC REGION

## 2018-11-16 NOTE — PN
Physical Exam: 


SUBJECTIVE: Patient seen and examined





OBJECTIVE:





right shoulder pain improved


had bm yesterday


refused mag citrate yesterday, willing to take today


 Vital Signs











 Period  Temp  Pulse  Resp  BP Sys/Woody  Pulse Ox


 


 Last 24 Hr  98 F-98.7 F  83-94  18-20  114-127/51-79  








GENERAL: The patient is awake, alert, and fully oriented, in no acute distress.


HEAD: Normal with no signs of trauma.


EYES: PERRL, extraocular movements intact, sclera anicteric, conjunctiva clear. 

No ptosis. 


ENT: Ears normal, nares patent, oropharynx clear without exudates, moist mucous 

membranes.


NECK: Trachea midline, full range of motion, supple. 


LUNGS: Breath sounds equal, clear to auscultation bilaterally


HEART: Regular rate and rhythm


ABDOMEN: soft, mildly tender to touch, no nausea or vomiting. encourage pt to 

get oob to chair.  minimize use of narcotics as tolerated.


EXTREMITIES: no edema. right arm no longer painful or tender.  lidoderm patch 

helping.


NEUROLOGICAL:  Normal speech, gait not observed.


PSYCH: Normal mood, normal affect.


SKIN: Warm, dry, normal turgor, no rashes or lesions noted


 Laboratory Results - last 24 hr











  11/16/18 11/16/18





  09:52 09:52


 


WBC  6.5 


 


RBC  3.40 L 


 


Hgb  10.1 L 


 


Hct  30.4 L 


 


MCV  89.4 


 


MCH  29.6 


 


MCHC  33.1 


 


RDW  14.2 


 


Plt Count  129 L 


 


MPV  9.9 


 


Absolute Neuts (auto)  4.1 


 


Neutrophils %  63.3 


 


Lymphocytes %  27.3  D 


 


Monocytes %  7.7 


 


Eosinophils %  1.3  D 


 


Basophils %  0.4 


 


Nucleated RBC %  0 


 


Sodium   135 L


 


Potassium   3.5


 


Chloride   98


 


Carbon Dioxide   28


 


Anion Gap   9


 


BUN   13


 


Creatinine   0.8


 


Creat Clearance w eGFR   > 60


 


Random Glucose   101


 


Calcium   8.0 L


 


Magnesium   2.2


 


Total Bilirubin   0.5


 


AST   71 H


 


ALT   46


 


Alkaline Phosphatase   77


 


Total Protein   6.5


 


Albumin   3.1 L








Active Medications











Generic Name Dose Route Start Last Admin





  Trade Name Freq  PRN Reason Stop Dose Admin


 


Acetaminophen  650 mg  11/14/18 09:26  11/14/18 14:04





  Tylenol -  PO   650 mg





  Q6H PRN   Administration





  PAIN LEVEL 4 - 6   





     





     





     


 


Dexamethasone Sodium Phosphate  4 mg  11/13/18 16:21  





  Decadron Injection -  IVPUSH   





  ONCE PRN   





  NAUSEA AND/OR VOMITING   





     





     





     


 


Diphenhydramine HCl  12.5 mg  11/13/18 16:21  





  Benadryl Injection -  IVPUSH   





  ONCE PRN   





  FOR ITCHING   





     





     





     


 


Diphenhydramine HCl  25 mg  11/13/18 16:33  





  Benadryl -  PO   





  Q6H PRN   





  FOR ITCHING   





     





     





     


 


Docusate Sodium  100 mg  11/13/18 22:00  11/16/18 05:31





  Colace -  PO   100 mg





  TID RENATO   Administration





     





     





     





     


 


Fentanyl  50 mcg  11/13/18 16:07  11/13/18 16:40





  Sublimaze Injection -  IVPUSH   50 mcg





  [id number] PRN   Administration





  PAIN-PACU ORDER X 4 DOSES ONLY   





     





     





     


 


Ferrous Sulfate  325 mg  11/14/18 10:00  11/16/18 10:13





  Feosol -  PO   325 mg





  DAILY RENATO   Administration





     





     





     





     


 


Folic Acid  1 mg  11/14/18 10:00  11/15/18 09:52





  Folic Acid -  PO   1 mg





  DAILY RENATO   Administration





     





     





     





     


 


Heparin Sodium (Porcine)  5,000 unit  11/13/18 22:00  11/16/18 05:31





  Heparin -  SQ   5,000 unit





  TID RENATO   Administration





     





     





     





     


 


Cefazolin Sodium 1 gm/  50 mls @ 100 mls/hr  11/13/18 19:30  11/16/18 10:13





  Dextrose  IVPB   100 mls/hr





  Q8H-IV RENATO   Administration





     





     





     





     


 


Lactated Ringer's  1,000 ml in 1,000 mls @ 125 mls/hr  11/13/18 16:45  11/15/18 

17:56





  Lactated Ringers Solution  IV   Not Given





  ASDIR RENATO   





     





     





     





     


 


Lidocaine  1 patch  11/14/18 10:00  11/16/18 10:13





  Lidoderm Patch -  TP   1 patch





  DAILY RENATO   Administration





     





     





     





     


 


Magnesium Citrate  300 ml  11/16/18 13:48  





  Citroma -  PO  11/16/18 13:49  





  ONCE ONE   





     





     





     





     


 


Miscellaneous  1 each  11/14/18 22:00  11/15/18 22:13





  Lidoderm Patch Removal  MC   1 each





  DAILY@2200 RENATO   Administration





     





     





     





     


 


Ondansetron HCl  4 mg  11/13/18 16:21  





  Zofran Injection  IVPUSH   





  Q4H PRN   





  NAUSEA AND/OR VOMITING   





     





     





     


 


Ondansetron HCl  4 mg  11/13/18 16:33  





  Zofran Injection  IVPUSH   





  Q6H PRN   





  NAUSEA   





     





     





     


 


Oxycodone HCl  5 mg  11/16/18 08:55  





  Roxicodone -  PO   





  Q4H PRN   





  PAIN LEVEL 1-5   





     





     





     


 


Oxycodone HCl  10 mg  11/16/18 08:57  





  Roxicodone -  PO   





  Q4H PRN   





  PAIN LEVEL 6-10   





     





     





     


 


Pantoprazole Sodium  40 mg  11/14/18 10:00  11/16/18 10:13





  Protonix -  PO   40 mg





  DAILY RENATO   Administration





     





     





     





     


 


Polyethylene Glycol  17 gm  11/14/18 10:00  11/16/18 10:13





  Miralax (For Daily Use) -  PO   17 gm





  DAILY RENATO   Administration





     





     





     





     


 


Promethazine HCl  12.5 mg  11/13/18 16:21  





  Phenergan Injection -  IVPB   





  Q6H PRN   





  NAUSEA AND/OR VOMITING   





     





     





     


 


Simethicone  80 mg  11/14/18 08:48  11/15/18 22:23





  Mylicon -  PO   80 mg





  Q4H PRN   Administration





  GAS   





     





     





     








ASSESSMENT/PLAN:





Patient is a 48 year old male with a past medical history of chronic 

progressive back pain.  





He presented on 11/13/2018 for elective back surgery and is POD #3 of 

exploration of spinal fusion, removal or hardware and T3-T9 decompression (re-

operative laminectomies and T3-T5) with multilevel transpedicle/costovertebral 

decompressoin T67, T78, T89, complete decompression behind T7, osteotomies, and 

T3-T9 posterior instrumented fusion and deformity correction.





Surgery:


Back Surgery, POD#3


Post op monitoring, vitals and labs stable.


had bm yesterday, still c/o of constipation, will give mag citrate


on oxycodone 5,10mg prn


incentive spirometer q1 hours


physical therapy





Right shoulder pain, improved


lidoderm applied to right upper shoulder, pt states pain improved.  





fen:


regular diet


monitor electrolytes


no ivf





prophy:


SCDs


protonix


simethicone





full code





Visit type





- Emergency Visit


Emergency Visit: Yes


ED Registration Date: 11/13/18


Care time: The patient presented to the Emergency Department on the above date 

and was hospitalized for further evaluation of their emergent condition.





- New Patient


This patient is new to me today: No





- Critical Care


Critical Care patient: No





- Discharge Referral


Referred to [facility] No

## 2018-11-16 NOTE — PN
Progress Note (short form)





- Note


Progress Note: 





Surgery





Abdominal x-ray revealed general diffuse distention, no obvious obstruction, no 

air fluid levels seen. Given patient is passing gas and had a normal BM 

yesterday also tolerating a regular diet, Abdominal symptoms likely 2/2 

constipation. Will add fleet enema to bowel regimen and follow.





Evaluation and plan discussed with Dr Douglas. 





Problem List





- Problems


(1) Thoracic spondylosis


Code(s): M47.814 - SPONDYLOSIS W/O MYELOPATHY OR RADICULOPATHY, THORACIC REGION

## 2018-11-17 LAB
ALBUMIN SERPL-MCNC: 3 G/DL (ref 3.4–5)
ALP SERPL-CCNC: 75 U/L (ref 45–117)
ALT SERPL-CCNC: 45 U/L (ref 13–61)
ANION GAP SERPL CALC-SCNC: 8 MMOL/L (ref 8–16)
AST SERPL-CCNC: 49 U/L (ref 15–37)
BASOPHILS # BLD: 0.7 % (ref 0–2)
BILIRUB SERPL-MCNC: 0.4 MG/DL (ref 0.2–1)
BUN SERPL-MCNC: 12 MG/DL (ref 7–18)
CALCIUM SERPL-MCNC: 7.9 MG/DL (ref 8.5–10.1)
CHLORIDE SERPL-SCNC: 98 MMOL/L (ref 98–107)
CO2 SERPL-SCNC: 31 MMOL/L (ref 21–32)
CREAT SERPL-MCNC: 0.9 MG/DL (ref 0.55–1.3)
DEPRECATED RDW RBC AUTO: 14.1 % (ref 11.9–15.9)
EOSINOPHIL # BLD: 2.4 % (ref 0–4.5)
GLUCOSE SERPL-MCNC: 97 MG/DL (ref 74–106)
HCT VFR BLD CALC: 29.5 % (ref 35.4–49)
HGB BLD-MCNC: 9.8 GM/DL (ref 11.7–16.9)
LYMPHOCYTES # BLD: 28.7 % (ref 8–40)
MCH RBC QN AUTO: 29.8 PG (ref 25.7–33.7)
MCHC RBC AUTO-ENTMCNC: 33.2 G/DL (ref 32–35.9)
MCV RBC: 89.5 FL (ref 80–96)
MONOCYTES # BLD AUTO: 8.8 % (ref 3.8–10.2)
NEUTROPHILS # BLD: 59.4 % (ref 42.8–82.8)
PLATELET # BLD AUTO: 164 K/MM3 (ref 134–434)
PMV BLD: 9.4 FL (ref 7.5–11.1)
POTASSIUM SERPLBLD-SCNC: 3.9 MMOL/L (ref 3.5–5.1)
PROT SERPL-MCNC: 6.4 G/DL (ref 6.4–8.2)
RBC # BLD AUTO: 3.3 M/MM3 (ref 4–5.6)
SODIUM SERPL-SCNC: 137 MMOL/L (ref 136–145)
WBC # BLD AUTO: 5.3 K/MM3 (ref 4–10)

## 2018-11-17 RX ADMIN — DOCUSATE SODIUM SCH MG: 100 CAPSULE, LIQUID FILLED ORAL at 22:14

## 2018-11-17 RX ADMIN — CEFAZOLIN SCH MLS/HR: 1 INJECTION, POWDER, FOR SOLUTION INTRAVENOUS at 01:16

## 2018-11-17 RX ADMIN — CEFAZOLIN SCH MLS/HR: 1 INJECTION, POWDER, FOR SOLUTION INTRAVENOUS at 10:09

## 2018-11-17 RX ADMIN — FERROUS SULFATE TAB EC 324 MG (65 MG FE EQUIVALENT) SCH MG: 324 (65 FE) TABLET DELAYED RESPONSE at 10:44

## 2018-11-17 RX ADMIN — FOLIC ACID SCH MG: 1 TABLET ORAL at 10:44

## 2018-11-17 RX ADMIN — DOCUSATE SODIUM SCH MG: 100 CAPSULE, LIQUID FILLED ORAL at 05:45

## 2018-11-17 RX ADMIN — Medication PRN MG: at 22:14

## 2018-11-17 RX ADMIN — HEPARIN SODIUM SCH UNIT: 5000 INJECTION, SOLUTION INTRAVENOUS; SUBCUTANEOUS at 22:16

## 2018-11-17 RX ADMIN — ACETAMINOPHEN PRN MG: 325 TABLET ORAL at 22:16

## 2018-11-17 RX ADMIN — Medication SCH EACH: at 22:16

## 2018-11-17 RX ADMIN — SENNOSIDES SCH TAB: 8.6 TABLET, FILM COATED ORAL at 22:14

## 2018-11-17 RX ADMIN — PANTOPRAZOLE SODIUM SCH MG: 40 TABLET, DELAYED RELEASE ORAL at 10:44

## 2018-11-17 RX ADMIN — HEPARIN SODIUM SCH UNIT: 5000 INJECTION, SOLUTION INTRAVENOUS; SUBCUTANEOUS at 05:44

## 2018-11-17 RX ADMIN — CEFAZOLIN SCH MLS/HR: 1 INJECTION, POWDER, FOR SOLUTION INTRAVENOUS at 18:51

## 2018-11-17 RX ADMIN — POLYETHYLENE GLYCOL 3350 SCH GM: 17 POWDER, FOR SOLUTION ORAL at 10:42

## 2018-11-17 RX ADMIN — LIDOCAINE SCH PATCH: 50 PATCH TOPICAL at 10:44

## 2018-11-17 RX ADMIN — HEPARIN SODIUM SCH UNIT: 5000 INJECTION, SOLUTION INTRAVENOUS; SUBCUTANEOUS at 13:28

## 2018-11-17 RX ADMIN — DOCUSATE SODIUM SCH MG: 100 CAPSULE, LIQUID FILLED ORAL at 13:28

## 2018-11-17 NOTE — PN
Physical Exam: 


SUBJECTIVE: Patient seen and examined at the bedside.  





OBJECTIVE:





in no acute distress.but c/o bilateral upper shoulder pain he attributes to 

sleeping.  no back pain.  abdominal pain improving.


some constipation still, will give dulcolax pr and po





 Vital Signs











 Period  Temp  Pulse  Resp  BP Sys/Woody  Pulse Ox


 


 Last 24 Hr  98.1 F-98.7 F  74-89  18-20  116-135/65-75  96








GENERAL: The patient is awake, alert, and fully oriented, in no acute distress.


HEAD: Normal with no signs of trauma.


EYES: PERRL, extraocular movements intact, sclera anicteric, conjunctiva clear. 

No ptosis. 


ENT: Ears normal, nares patent, oropharynx clear without exudates, moist mucous 

membranes.


NECK: Trachea midline, full range of motion, supple. 


LUNGS: Breath sounds equal, clear to auscultation bilaterally


HEART: Regular rate and rhythm


ABDOMEN: soft, mildly tender to touch, no nausea or vomiting. encourage pt to 

get oob to chair.  minimize use of narcotics as tolerated.


EXTREMITIES: no edema. right arm no longer painful or tender.  lidoderm patch 

helping.


NEUROLOGICAL:  Normal speech, gait not observed.


PSYCH: Normal mood, normal affect.


SKIN: Warm, dry, normal turgor, no rashes or lesions notedLaboratory Results - 

last 24 hr











  11/16/18 11/16/18





  09:52 09:52


 


WBC  6.5 


 


RBC  3.40 L 


 


Hgb  10.1 L 


 


Hct  30.4 L 


 


MCV  89.4 


 


MCH  29.6 


 


MCHC  33.1 


 


RDW  14.2 


 


Plt Count  129 L 


 


MPV  9.9 


 


Absolute Neuts (auto)  4.1 


 


Neutrophils %  63.3 


 


Lymphocytes %  27.3  D 


 


Monocytes %  7.7 


 


Eosinophils %  1.3  D 


 


Basophils %  0.4 


 


Nucleated RBC %  0 


 


Sodium   135 L


 


Potassium   3.5


 


Chloride   98


 


Carbon Dioxide   28


 


Anion Gap   9


 


BUN   13


 


Creatinine   0.8


 


Creat Clearance w eGFR   > 60


 


Random Glucose   101


 


Calcium   8.0 L


 


Magnesium   2.2


 


Total Bilirubin   0.5


 


AST   71 H


 


ALT   46


 


Alkaline Phosphatase   77


 


Total Protein   6.5


 


Albumin   3.1 L








Active Medications











Generic Name Dose Route Start Last Admin





  Trade Name Freq  PRN Reason Stop Dose Admin


 


Acetaminophen  650 mg  11/14/18 09:26  11/14/18 14:04





  Tylenol -  PO   650 mg





  Q6H PRN   Administration





  PAIN LEVEL 4 - 6   





     





     





     


 


Dexamethasone Sodium Phosphate  4 mg  11/13/18 16:21  





  Decadron Injection -  IVPUSH   





  ONCE PRN   





  NAUSEA AND/OR VOMITING   





     





     





     


 


Diphenhydramine HCl  25 mg  11/13/18 16:33  





  Benadryl -  PO   





  Q6H PRN   





  FOR ITCHING   





     





     





     


 


Docusate Sodium  100 mg  11/13/18 22:00  11/17/18 05:45





  Colace -  PO   100 mg





  TID RENATO   Administration





     





     





     





     


 


Ferrous Sulfate  325 mg  11/14/18 10:00  11/16/18 10:13





  Feosol -  PO   325 mg





  DAILY RENATO   Administration





     





     





     





     


 


Folic Acid  1 mg  11/14/18 10:00  11/16/18 15:41





  Folic Acid -  PO   1 mg





  DAILY RENATO   Administration





     





     





     





     


 


Heparin Sodium (Porcine)  5,000 unit  11/13/18 22:00  11/17/18 05:44





  Heparin -  SQ   5,000 unit





  TID RENATO   Administration





     





     





     





     


 


Cefazolin Sodium 1 gm/  50 mls @ 100 mls/hr  11/13/18 19:30  11/17/18 01:16





  Dextrose  IVPB   100 mls/hr





  Q8H-IV RENATO   Administration





     





     





     





     


 


Lactated Ringer's  1,000 ml in 1,000 mls @ 125 mls/hr  11/13/18 16:45  11/16/18 

17:21





  Lactated Ringers Solution  IV   Not Given





  ASDIR RENATO   





     





     





     





     


 


Lidocaine  1 patch  11/14/18 10:00  11/16/18 10:13





  Lidoderm Patch -  TP   1 patch





  DAILY RENATO   Administration





     





     





     





     


 


Miscellaneous  1 each  11/14/18 22:00  11/16/18 21:18





  Lidoderm Patch Removal  MC   1 each





  DAILY@2200 RENATO   Administration





     





     





     





     


 


Ondansetron HCl  4 mg  11/13/18 16:33  





  Zofran Injection  IVPUSH   





  Q6H PRN   





  NAUSEA   





     





     





     


 


Oxycodone HCl  5 mg  11/16/18 08:55  11/16/18 20:28





  Roxicodone -  PO   5 mg





  Q4H PRN   Administration





  PAIN LEVEL 1-5   





     





     





     


 


Oxycodone HCl  10 mg  11/16/18 08:57  11/17/18 08:25





  Roxicodone -  PO   10 mg





  Q4H PRN   Administration





  PAIN LEVEL 6-10   





     





     





     


 


Pantoprazole Sodium  40 mg  11/14/18 10:00  11/16/18 10:13





  Protonix -  PO   40 mg





  DAILY RENATO   Administration





     





     





     





     


 


Polyethylene Glycol  17 gm  11/14/18 10:00  11/16/18 10:13





  Miralax (For Daily Use) -  PO   17 gm





  DAILY RENATO   Administration





     





     





     





     


 


Promethazine HCl  12.5 mg  11/13/18 16:21  





  Phenergan Injection -  IVPB   





  Q6H PRN   





  NAUSEA AND/OR VOMITING   





     





     





     


 


Simethicone  80 mg  11/14/18 08:48  11/15/18 22:23





  Mylicon -  PO   80 mg





  Q4H PRN   Administration





  GAS   





     





     





     











ASSESSMENT/PLAN:





Patient is a 48 year old male with a past medical history of chronic 

progressive back pain.  





He presented on 11/13/2018 for elective back surgery and is POD #4 of 

exploration of spinal fusion, removal or hardware and T3-T9 decompression (re-

operative laminectomies and T3-T5) with multilevel transpedicle/costovertebral 

decompressoin T67, T78, T89, complete decompression behind T7, osteotomies, and 

T3-T9 posterior instrumented fusion and deformity correction.





Surgery:


Back Surgery, POD#4


Post op monitoring, vitals and labs stable., still c/o of constipation, will 

give dulcolax pr and po


on oxycodone 5mg,10mg prn


incentive spirometer q1 hours encouraged


physical therapy





Right shoulder pain, improved


lidoderm applied to bilateral shoulders for discomfort





fen:


regular diet


monitor electrolytes


no ivf





prophy:


SCDs


protonix


simethicone





full code








Visit type





- Emergency Visit


Emergency Visit: Yes


ED Registration Date: 11/13/18


Care time: The patient presented to the Emergency Department on the above date 

and was hospitalized for further evaluation of their emergent condition.





- New Patient


This patient is new to me today: No





- Critical Care


Critical Care patient: No





- Discharge Referral


Referred to [facility] No

## 2018-11-18 RX ADMIN — DOCUSATE SODIUM SCH MG: 100 CAPSULE, LIQUID FILLED ORAL at 14:30

## 2018-11-18 RX ADMIN — SENNOSIDES SCH TAB: 8.6 TABLET, FILM COATED ORAL at 21:24

## 2018-11-18 RX ADMIN — FERROUS SULFATE TAB EC 324 MG (65 MG FE EQUIVALENT) SCH MG: 324 (65 FE) TABLET DELAYED RESPONSE at 10:22

## 2018-11-18 RX ADMIN — ACETAMINOPHEN PRN MG: 325 TABLET ORAL at 14:29

## 2018-11-18 RX ADMIN — CEFAZOLIN SCH MLS/HR: 1 INJECTION, POWDER, FOR SOLUTION INTRAVENOUS at 01:21

## 2018-11-18 RX ADMIN — CEFAZOLIN SCH MLS/HR: 1 INJECTION, POWDER, FOR SOLUTION INTRAVENOUS at 10:22

## 2018-11-18 RX ADMIN — SODIUM CHLORIDE, POTASSIUM CHLORIDE, SODIUM LACTATE AND CALCIUM CHLORIDE SCH: 600; 310; 30; 20 INJECTION, SOLUTION INTRAVENOUS at 21:21

## 2018-11-18 RX ADMIN — POLYETHYLENE GLYCOL 3350 SCH GM: 17 POWDER, FOR SOLUTION ORAL at 10:24

## 2018-11-18 RX ADMIN — HEPARIN SODIUM SCH UNIT: 5000 INJECTION, SOLUTION INTRAVENOUS; SUBCUTANEOUS at 21:25

## 2018-11-18 RX ADMIN — DOCUSATE SODIUM SCH MG: 100 CAPSULE, LIQUID FILLED ORAL at 06:34

## 2018-11-18 RX ADMIN — HEPARIN SODIUM SCH UNIT: 5000 INJECTION, SOLUTION INTRAVENOUS; SUBCUTANEOUS at 06:34

## 2018-11-18 RX ADMIN — HEPARIN SODIUM SCH UNIT: 5000 INJECTION, SOLUTION INTRAVENOUS; SUBCUTANEOUS at 14:29

## 2018-11-18 RX ADMIN — Medication PRN MG: at 21:26

## 2018-11-18 RX ADMIN — LIDOCAINE SCH PATCH: 50 PATCH TOPICAL at 10:22

## 2018-11-18 RX ADMIN — Medication SCH EACH: at 21:25

## 2018-11-18 RX ADMIN — DOCUSATE SODIUM SCH MG: 100 CAPSULE, LIQUID FILLED ORAL at 21:24

## 2018-11-18 RX ADMIN — FOLIC ACID SCH MG: 1 TABLET ORAL at 10:22

## 2018-11-18 RX ADMIN — PANTOPRAZOLE SODIUM SCH MG: 40 TABLET, DELAYED RELEASE ORAL at 10:22

## 2018-11-18 NOTE — PN
Physical Exam: 


SUBJECTIVE: 





Patient seen and examined at the bedside.  still having abdominal pain, but 

improved.  bilateral shoulder pain improved.


patient highly encouraged to ambulate more as this will help his constipation.





OBJECTIVE:


discharge planning


drain to be removed tomorrow.  discharge home tomorrow once cleared by surgery.


 Vital Signs











 Period  Temp  Pulse  Resp  BP Sys/Woody  Pulse Ox


 


 Last 24 Hr  98.1 F-98.9 F  63-77  18-20  101-137/60-71  100








GENERAL: awake, alert, in no acute distress.


HEAD: Normal with no signs of trauma.


EYES: PERRL, extraocular movements intact, sclera anicteric, conjunctiva clear. 

No ptosis. 


ENT: Ears normal, nares patent, oropharynx clear without exudates, moist mucous 

membranes.


NECK: Trachea midline, full range of motion, supple. 


LUNGS: Breath sounds equal, clear to auscultation bilaterally


HEART: Regular rate and rhythm


ABDOMEN: soft, not tender to touch, no nausea or vomiting. encourage pt to get 

oob to chair.  minimize use of narcotics as tolerated. ambulate more. 


EXTREMITIES: no edema. right arm no longer painful or tender.  lidoderm patch 

helping.


NEUROLOGICAL:  Normal speech, gait not observed.


PSYCH: Normal mood, normal affect.


Active Medications











Generic Name Dose Route Start Last Admin





  Trade Name Freq  PRN Reason Stop Dose Admin


 


Acetaminophen  650 mg  11/14/18 09:26  11/17/18 22:16





  Tylenol -  PO   650 mg





  Q6H PRN   Administration





  PAIN LEVEL 4 - 6   





     





     





     


 


Benzocaine/Menthol  1 each  11/17/18 18:53  





  Cepacol Lozenge -  MM   





  PRN PRN   





  SORE THROAT   





     





     





     


 


Dexamethasone Sodium Phosphate  4 mg  11/13/18 16:21  





  Decadron Injection -  IVPUSH   





  ONCE PRN   





  NAUSEA AND/OR VOMITING   





     





     





     


 


Diphenhydramine HCl  25 mg  11/13/18 16:33  





  Benadryl -  PO   





  Q6H PRN   





  FOR ITCHING   





     





     





     


 


Docusate Sodium  100 mg  11/13/18 22:00  11/18/18 06:34





  Colace -  PO   100 mg





  TID RENATO   Administration





     





     





     





     


 


Ferrous Sulfate  325 mg  11/14/18 10:00  11/18/18 10:22





  Feosol -  PO   325 mg





  DAILY RENATO   Administration





     





     





     





     


 


Folic Acid  1 mg  11/14/18 10:00  11/18/18 10:22





  Folic Acid -  PO   1 mg





  DAILY RENATO   Administration





     





     





     





     


 


Heparin Sodium (Porcine)  5,000 unit  11/13/18 22:00  11/18/18 06:34





  Heparin -  SQ   5,000 unit





  TID RENATO   Administration





     





     





     





     


 


Lactated Ringer's  1,000 ml in 1,000 mls @ 125 mls/hr  11/13/18 16:45  11/16/18 

17:21





  Lactated Ringers Solution  IV   Not Given





  ASDIR RENATO   





     





     





     





     


 


Lidocaine  2 patch  11/17/18 08:50  11/18/18 10:22





  Lidoderm Patch -  TP   2 patch





  DAILY RENATO   Administration





     





     





     





     


 


Magnesium Citrate  300 ml  11/18/18 11:41  





  Citroma -  PO  11/18/18 11:42  





  ONCE ONE   





     





     





     





     


 


Melatonin  5 mg  11/17/18 08:49  11/17/18 22:14





  Melatonin  PO   5 mg





  HS PRN   Administration





  INSOMNIA   





     





     





     


 


Miscellaneous  1 each  11/14/18 22:00  11/17/18 22:16





  Lidoderm Patch Removal  MC   1 each





  DAILY@2200 RENATO   Administration





     





     





     





     


 


Ondansetron HCl  4 mg  11/13/18 16:33  11/18/18 08:58





  Zofran Injection  IVPUSH   4 mg





  Q6H PRN   Administration





  NAUSEA   





     





     





     


 


Oxycodone HCl  5 mg  11/16/18 08:55  11/16/18 20:28





  Roxicodone -  PO   5 mg





  Q4H PRN   Administration





  PAIN LEVEL 1-5   





     





     





     


 


Oxycodone HCl  10 mg  11/16/18 08:57  11/17/18 08:25





  Roxicodone -  PO   10 mg





  Q4H PRN   Administration





  PAIN LEVEL 6-10   





     





     





     


 


Pantoprazole Sodium  40 mg  11/14/18 10:00  11/18/18 10:22





  Protonix -  PO   40 mg





  DAILY RENATO   Administration





     





     





     





     


 


Polyethylene Glycol  17 gm  11/14/18 10:00  11/18/18 10:24





  Miralax (For Daily Use) -  PO   17 gm





  DAILY RENATO   Administration





     





     





     





     


 


Promethazine HCl  12.5 mg  11/13/18 16:21  





  Phenergan Injection -  IVPB   





  Q6H PRN   





  NAUSEA AND/OR VOMITING   





     





     





     


 


Senna  2 tab  11/17/18 22:00  11/17/18 22:14





  Senna -  PO   2 tab





  HS RENATO   Administration





     





     





     





     


 


Simethicone  80 mg  11/14/18 08:48  11/15/18 22:23





  Mylicon -  PO   80 mg





  Q4H PRN   Administration





  GAS   





     





     





     








ASSESSMENT/PLAN:





Patient is a 48 year old male with a past medical history of chronic 

progressive back pain.  





He presented on 11/13/2018 for elective back surgery and is POD #5 of 

exploration of spinal fusion, removal or hardware and T3-T9 decompression (re-

operative laminectomies and T3-T5) with multilevel transpedicle/costovertebral 

decompressoin T67, T78, T89, complete decompression behind T7, osteotomies, and 

T3-T9 posterior instrumented fusion and deformity correction.





Surgery:


Back Surgery, POD#5


Post op monitoring, vitals and labs stable., had bm but still c/o of 

constipation, will give mag citrate x 1


on oxycodone 5mg, 10mg prn.  patient encouraged to increase ambulation which 

will help abdominal pain and constipation.


incentive spirometer q1 hours encouraged


physical therapy in a.m. 





Right shoulder pain, improved


lidoderm applied to bilateral shoulders for discomfort





fen:


regular diet


monitor electrolytes


no ivf





prophy:


SCDs


protonix


simethicone








Visit type





- Emergency Visit


Emergency Visit: Yes


ED Registration Date: 11/13/18


Care time: The patient presented to the Emergency Department on the above date 

and was hospitalized for further evaluation of their emergent condition.





- New Patient


This patient is new to me today: No





- Critical Care


Critical Care patient: No





- Discharge Referral


Referred to [facility] No

## 2018-11-19 LAB
ALBUMIN SERPL-MCNC: 3.5 G/DL (ref 3.4–5)
ALP SERPL-CCNC: 87 U/L (ref 45–117)
ALT SERPL-CCNC: 49 U/L (ref 13–61)
ANION GAP SERPL CALC-SCNC: 8 MMOL/L (ref 8–16)
AST SERPL-CCNC: 34 U/L (ref 15–37)
BASOPHILS # BLD: 0.6 % (ref 0–2)
BILIRUB SERPL-MCNC: 0.4 MG/DL (ref 0.2–1)
BUN SERPL-MCNC: 15 MG/DL (ref 7–18)
CALCIUM SERPL-MCNC: 8.6 MG/DL (ref 8.5–10.1)
CHLORIDE SERPL-SCNC: 102 MMOL/L (ref 98–107)
CO2 SERPL-SCNC: 28 MMOL/L (ref 21–32)
CREAT SERPL-MCNC: 0.9 MG/DL (ref 0.55–1.3)
DEPRECATED RDW RBC AUTO: 14 % (ref 11.9–15.9)
EOSINOPHIL # BLD: 2.1 % (ref 0–4.5)
GLUCOSE SERPL-MCNC: 96 MG/DL (ref 74–106)
HCT VFR BLD CALC: 32.4 % (ref 35.4–49)
HGB BLD-MCNC: 11.3 GM/DL (ref 11.7–16.9)
LYMPHOCYTES # BLD: 26.8 % (ref 8–40)
MCH RBC QN AUTO: 30.8 PG (ref 25.7–33.7)
MCHC RBC AUTO-ENTMCNC: 34.9 G/DL (ref 32–35.9)
MCV RBC: 88.3 FL (ref 80–96)
MONOCYTES # BLD AUTO: 7.6 % (ref 3.8–10.2)
NEUTROPHILS # BLD: 62.9 % (ref 42.8–82.8)
PLATELET # BLD AUTO: 263 K/MM3 (ref 134–434)
PMV BLD: 8.8 FL (ref 7.5–11.1)
POTASSIUM SERPLBLD-SCNC: 4.3 MMOL/L (ref 3.5–5.1)
PROT SERPL-MCNC: 7.2 G/DL (ref 6.4–8.2)
RBC # BLD AUTO: 3.66 M/MM3 (ref 4–5.6)
SODIUM SERPL-SCNC: 138 MMOL/L (ref 136–145)
WBC # BLD AUTO: 5.1 K/MM3 (ref 4–10)

## 2018-11-19 RX ADMIN — HEPARIN SODIUM SCH UNIT: 5000 INJECTION, SOLUTION INTRAVENOUS; SUBCUTANEOUS at 14:20

## 2018-11-19 RX ADMIN — LIDOCAINE SCH PATCH: 50 PATCH TOPICAL at 08:46

## 2018-11-19 RX ADMIN — FERROUS SULFATE TAB EC 324 MG (65 MG FE EQUIVALENT) SCH: 324 (65 FE) TABLET DELAYED RESPONSE at 11:24

## 2018-11-19 RX ADMIN — PANTOPRAZOLE SODIUM SCH MG: 40 TABLET, DELAYED RELEASE ORAL at 11:15

## 2018-11-19 RX ADMIN — SENNOSIDES SCH TAB: 8.6 TABLET, FILM COATED ORAL at 21:29

## 2018-11-19 RX ADMIN — HEPARIN SODIUM SCH UNIT: 5000 INJECTION, SOLUTION INTRAVENOUS; SUBCUTANEOUS at 21:29

## 2018-11-19 RX ADMIN — SODIUM CHLORIDE SCH MLS/HR: 9 INJECTION, SOLUTION INTRAVENOUS at 16:55

## 2018-11-19 RX ADMIN — DOCUSATE SODIUM SCH MG: 100 CAPSULE, LIQUID FILLED ORAL at 14:20

## 2018-11-19 RX ADMIN — LIDOCAINE SCH: 50 PATCH TOPICAL at 11:14

## 2018-11-19 RX ADMIN — HEPARIN SODIUM SCH UNIT: 5000 INJECTION, SOLUTION INTRAVENOUS; SUBCUTANEOUS at 06:28

## 2018-11-19 RX ADMIN — FOLIC ACID SCH MG: 1 TABLET ORAL at 11:15

## 2018-11-19 RX ADMIN — POLYETHYLENE GLYCOL 3350 SCH GM: 17 POWDER, FOR SOLUTION ORAL at 11:15

## 2018-11-19 RX ADMIN — DOCUSATE SODIUM SCH MG: 100 CAPSULE, LIQUID FILLED ORAL at 06:28

## 2018-11-19 RX ADMIN — Medication SCH: at 21:29

## 2018-11-19 RX ADMIN — DOCUSATE SODIUM SCH MG: 100 CAPSULE, LIQUID FILLED ORAL at 21:29

## 2018-11-19 NOTE — PN
Physical Exam: 


SUBJECTIVE: Patient seen and examined at the bedside.


still c/o of abdominal pain, mild nausea. 





OBJECTIVE:


abdominal xray shows air fluid levels seen both in the colon and in the small 

bowel.  pt seen by surgery (Dr. Crouch). made npo except for meds





 Vital Signs











 Period  Temp  Pulse  Resp  BP Sys/Woody  Pulse Ox


 


 Last 24 Hr  97.9 F-98.3 F  69-79  18-20  123-143/65-77  100








GENERAL: awake, alert, in no acute distress.


HEAD: Normal with no signs of trauma.


EYES: PERRL, extraocular movements intact, sclera anicteric, conjunctiva clear. 

No ptosis. 


ENT: Ears normal, nares patent, oropharynx clear without exudates, moist mucous 

membranes.


NECK: Trachea midline, full range of motion, supple. 


LUNGS: Breath sounds equal, clear to auscultation bilaterally


HEART: Regular rate and rhythm


ABDOMEN: soft, mildly tender to touch, no nausea or vomiting. abdominal xray as 

noted above.  pt now npo


EXTREMITIES: no edema. right arm no longer painful or tender.  lidoderm patch 

helping.


NEUROLOGICAL:  Normal speech, gait not observed.


PSYCH: Normal mood, normal affect.








 Laboratory Results - last 24 hr











  11/19/18 11/19/18 11/19/18





  11:23 11:23 11:23


 


WBC  5.1  


 


RBC  3.66 L  


 


Hgb  11.3 L  


 


Hct  32.4 L  


 


MCV  88.3  


 


MCH  30.8  


 


MCHC  34.9  


 


RDW  14.0  


 


Plt Count  263  D  


 


MPV  8.8  


 


Absolute Neuts (auto)  3.2  


 


Neutrophils %  62.9  


 


Lymphocytes %  26.8  


 


Monocytes %  7.6  


 


Eosinophils %  2.1  


 


Basophils %  0.6  


 


Nucleated RBC %  0  


 


Sodium   138 


 


Potassium   4.3 


 


Chloride   102 


 


Carbon Dioxide   28 


 


Anion Gap   8 


 


BUN   15 


 


Creatinine   0.9 


 


Creat Clearance w eGFR   > 60 


 


Random Glucose   96 


 


Lactic Acid    0.9


 


Calcium   8.6 


 


Total Bilirubin   0.4 


 


AST   34 


 


ALT   49 


 


Alkaline Phosphatase   87 


 


Total Protein   7.2 


 


Albumin   3.5 








Active Medications











Generic Name Dose Route Start Last Admin





  Trade Name Freq  PRN Reason Stop Dose Admin


 


Acetaminophen  1,000 mg  11/19/18 09:47  





  Tylenol -  PO   





  Q6H PRN   





  PAIN OR FEVER   





     





     





     


 


Benzocaine/Menthol  1 each  11/17/18 18:53  





  Cepacol Lozenge -  MM   





  PRN PRN   





  SORE THROAT   





     





     





     


 


Dexamethasone Sodium Phosphate  4 mg  11/13/18 16:21  





  Decadron Injection -  IVPUSH   





  ONCE PRN   





  NAUSEA AND/OR VOMITING   





     





     





     


 


Diphenhydramine HCl  25 mg  11/13/18 16:33  





  Benadryl -  PO   





  Q6H PRN   





  FOR ITCHING   





     





     





     


 


Docusate Sodium  100 mg  11/13/18 22:00  11/19/18 14:20





  Colace -  PO   100 mg





  TID RENATO   Administration





     





     





     





     


 


Folic Acid  1 mg  11/14/18 10:00  11/19/18 11:15





  Folic Acid -  PO   1 mg





  DAILY RENATO   Administration





     





     





     





     


 


Heparin Sodium (Porcine)  5,000 unit  11/13/18 22:00  11/19/18 14:20





  Heparin -  SQ   5,000 unit





  TID RENATO   Administration





     





     





     





     


 


Lactated Ringer's  1,000 ml in 1,000 mls @ 125 mls/hr  11/13/18 16:45  11/18/18 

21:21





  Lactated Ringers Solution  IV   Not Given





  ASDIR RENATO   





     





     





     





     


 


Lidocaine  2 patch  11/17/18 08:50  11/19/18 11:14





  Lidoderm Patch -  TP   Not Given





  DAILY RENATO   





     





     





     





     


 


Melatonin  5 mg  11/17/18 08:49  11/18/18 21:26





  Melatonin  PO   5 mg





  HS PRN   Administration





  INSOMNIA   





     





     





     


 


Miscellaneous  1 each  11/14/18 22:00  11/18/18 21:25





  Lidoderm Patch Removal  MC   1 each





  DAILY@2200 RENATO   Administration





     





     





     





     


 


Ondansetron HCl  4 mg  11/13/18 16:33  11/18/18 08:58





  Zofran Injection  IVPUSH   4 mg





  Q6H PRN   Administration





  NAUSEA   





     





     





     


 


Pantoprazole Sodium  40 mg  11/14/18 10:00  11/19/18 11:15





  Protonix -  PO   40 mg





  DAILY RENATO   Administration





     





     





     





     


 


Polyethylene Glycol  17 gm  11/14/18 10:00  11/19/18 11:15





  Miralax (For Daily Use) -  PO   17 gm





  DAILY RENATO   Administration





     





     





     





     


 


Promethazine HCl  12.5 mg  11/13/18 16:21  





  Phenergan Injection -  IVPB   





  Q6H PRN   





  NAUSEA AND/OR VOMITING   





     





     





     


 


Senna  2 tab  11/17/18 22:00  11/18/18 21:24





  Senna -  PO   2 tab





  HS RENATO   Administration





     





     





     





     


 


Simethicone  80 mg  11/14/18 08:48  11/15/18 22:23





  Mylicon -  PO   80 mg





  Q4H PRN   Administration





  GAS   





     





     





     


 


Tramadol HCl  50 mg  11/19/18 09:46  11/19/18 11:15





  Ultram -  PO   50 mg





  Q4H PRN   Administration





  PAIN LEVEL 1-5   





     





     





     











ASSESSMENT/PLAN:





Patient is a 48 year old male with a past medical history of chronic 

progressive back pain.  





He presented on 11/13/2018 for elective back surgery and is POD #6 of 

exploration of spinal fusion, removal or hardware and T3-T9 decompression (re-

operative laminectomies and T3-T5) with multilevel transpedicle/costovertebral 

decompressoin T67, T78, T89, complete decompression behind T7, osteotomies, and 

T3-T9 posterior instrumented fusion and deformity correction.





Surgery:


Back Surgery, POD#6


Post op monitoring, vitals and labs stable., had bm but still c/o of 

constipation and abdominal pain.


abdominal xray shows air fluid levels seen both in the colon and in the small 

bowel.  pt seen by surgery (Dr. Crouch). made npo except for meds


patient encouraged to increase ambulation which will help abdominal pain and 

constipation.


incentive spirometer q1 hours encouraged


physical therapy





Right shoulder pain, improved


lidoderm applied to bilateral shoulders for discomfort





fen:


npo


monitor electrolytes


ivf





prophy:


SCDs








Visit type





- Emergency Visit


Emergency Visit: Yes


ED Registration Date: 11/13/18


Care time: The patient presented to the Emergency Department on the above date 

and was hospitalized for further evaluation of their emergent condition.





- New Patient


This patient is new to me today: No





- Critical Care


Critical Care patient: No





- Discharge Referral


Referred to [facility] No

## 2018-11-19 NOTE — CONSULT
Consult


Consult Specialty:: General Surgery


Reason for Consultation:: abdominal pain





- History of Present Illness


Chief Complaint: abdominal pain


History of Present Illness: 





47yo male PMH Chronic progressive back pain x 10 years presents for elective 

surgery.  Patient had thoracic (T3-T5) and lumbar (L2-L5) fusions @ 8 years ago 

with Dr. Yasir Perea and was in relatively good health but he works in 

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 

been progressive over the years. He describes episodes of intermittent 

paresthesias and worsening weakness resulting in falls. He denies any bladder 

of bowel dysfunction. He has not had a bowel movement in 2 days, he has been on 

frequent opiate pain medication (ultram) to manage his operative site pain.  We 

were asked to assessed. 





- History Source


History Provided By: Patient, Medical Record


Limitations to Obtaining History: No Limitations





- Alcohol/Substance Use


Hx Alcohol Use: Yes (social)





- Smoking History


Smoking history: Never smoked





Home Medications





- Allergies


Allergies/Adverse Reactions: 


 Allergies











Allergy/AdvReac Type Severity Reaction Status Date / Time


 


apple Allergy Unknown  Verified 11/18/18 10:14


 


lentils Allergy Unknown  Verified 11/18/18 10:14


 


pear Allergy   Verified 11/16/18 14:11














- Home Medications


Home Medications: 


Ambulatory Orders





NK [No Known Home Medication]  11/08/18 











Review of Systems





- Review of Systems


Constitutional: denies: Chills, Fever


Eyes: denies: Blind Spots, Recent Change in Vision


HENT: denies: Difficult Swallowing, Throat Pain


Neck: denies: Decreased ROM, Tenderness


Cardiovascular: denies: Chest Pain, Palpitations


Respiratory: denies: Cough, SOB


Gastrointestinal: reports: Abdominal Pain, Constipation


Genitourinary: denies: Burning, Discharge, Dysuria


Breasts: reports: No Symptoms Reported.  denies: Pain


Musculoskeletal: reports: Back Pain.  denies: Muscle Pain, Muscle Cramps


Integumentary: denies: Blister, Bruising


Neurological: denies: Syncope, Tremors


Endocrine: denies: Unexplained Weight Gain, Unexplained Weight Loss


Hematology/Lymphatic: denies: Easily Bruised, Excessive Bleeding


Psychiatric: denies: Anxiety, Depression





Physical Exam


Vital Signs: 


 Vital Signs











Temperature  98.3 F   11/19/18 14:11


 


Pulse Rate  78   11/19/18 14:11


 


Respiratory Rate  18   11/19/18 14:11


 


Blood Pressure  127/70   11/19/18 14:11


 


O2 Sat by Pulse Oximetry (%)  100   11/18/18 21:00











Constitutional: Yes: Well Nourished, No Distress, Calm, Obese


Eyes: Yes: Conjunctiva Clear, EOM Intact


HENT: Yes: Atraumatic, Normocephalic


Neck: Yes: Supple, Trachea Midline


Cardiovascular: Yes: Regular Rate and Rhythm, S1, S2


Respiratory: Yes: Regular, CTA Bilaterally


Gastrointestinal: Yes: Normal Bowel Sounds, Soft, Abdomen, Obese, Distention, 

Tenderness (Discomfort diffuselfy, no focal tenderness).  No: Tenderness, 

Epigastrium, Tenderness, Rebound


...Rectal Exam: No: Deferred


Renal/: No: CVA Tenderness - Left, CVA Tenderness - Right


Extremities: No: Cool, Cyanosis


Edema: No


Peripheral Pulses WNL: Yes


Integumentary: No: Incision, Jaundice, Rash


Neurological: Yes: Alert, Oriented


Psychiatric: Yes: Alert, Oriented


Labs: 


 CBC, BMP





 11/19/18 11:23 





 11/19/18 11:23 











Imaging





- Results


X-ray: Report Reviewed (Abdominal xray is diffuse gas pattern. large and small 

bowel.), Image Reviewed





Problem List





- Problems


(1) Constipation due to opioid therapy


Assessment/Plan: 


47yo male with constipation secondary to opiate therapy causing pain and 

distension and discomfort of exam. he does not have peritonitis.  There is no 

indication for acute surgical intervention.  





Adequate IV and oral hydration 


increase dietary fiber 


GI eval for Constipation bowel regimen to be taken in conjunction with Opiates 


   Colace & miralax


   Biscadyl suppositories 


encourage ambulation


recall as needed 











Thank you for the opportunity to participate in the care of this patient.


Code(s): K59.03 - DRUG INDUCED CONSTIPATION; T40.2X5A - ADVERSE EFFECT OF OTHER 

OPIOIDS, INITIAL ENCOUNTER   





(2) Chronic back pain greater than 3 months duration


Code(s): M54.9 - DORSALGIA, UNSPECIFIED; G89.29 - OTHER CHRONIC PAIN   





(3) Thoracic spondylosis


Code(s): M47.814 - SPONDYLOSIS W/O MYELOPATHY OR RADICULOPATHY, THORACIC REGION

   





(4) Abdominal pain in male


Code(s): R10.9 - UNSPECIFIED ABDOMINAL PAIN

## 2018-11-19 NOTE — PN
Progress Note (short form)





- Note


Progress Note: 








POD 5, s/p T3-T9  Exploration of spinal fusion, removal or hardware and T3-T9 

decompression (re-operative laminectomies and T3-T5) with multilevel 

transpedicle/costovertebral decompressoin T67, T78, T89, Including complete 

decompression behind T7, osteotomies, and T3-T9 posterior instrumented fusion 

and deformity correction











Pt seen and examined. Continues to have significant abdominal pain, reports 

currently 7/10. received mag citrate yesterday with 2 small/hard bowel 

movements. Has been passing flatus with improvement in pain. Has been voiding 

without issue. OOB yesterday minimally due to back pain. Has decreased PO 

intake due to abdominal pain. Endorses burning in his rle which was present 

prior to sx. Denies cp, sob, n/v/d, calf pain or edema.











 Vital Signs











Temp  97.9 F   11/19/18 04:00


 


Pulse  69   11/19/18 04:00


 


Resp  20   11/19/18 04:00


 


BP  123/66   11/19/18 04:00


 


Pulse Ox  100   11/18/18 21:00








 Intake & Output











 11/18/18 11/18/18 11/19/18





 11:59 23:59 11:59


 


Intake Total 200  


 


Output Total 50 260 


 


Balance 150 -260 


 


Intake:   


 


  Oral 200  


 


Output:   


 


  Drainage 50 260 


 


    Medial Back 50 260 


 


Other:   


 


  Voiding Method Toilet Toilet 


 


  # Unmeasured Voids   


 


    Void  2 2




















 CBC, BMP





 11/17/18 08:40 





 11/17/18 08:40 











Gen: awake, alert, nad 


Resp: cta b/l


CV: rrr, s1s2


Abdomen: distended but soft, + ttp in all quadrants, + bowel sounds.


Back: upper back dressing c/d/i, no strikethrough noted. Drain to bile bag with 

approx 30cc serosanguinous drainage in bag. 


UE/LE's: b/l ue's 5/5 biceps/triceps/deltoids. b/l le's 5/5 dorsiflexion/

plantar flexion hip flexion/extension. SILT bilateral lower ext and upper ext.











A/P:  49 y/o M w/ Chronic progressive back pain x 10 years now s/p thoracic (T3-

T5) and lumbar (L2-L5) fusions @ 8 years ago at OSH, now POD 5, s/p T3-T9  

Exploration of spinal fusion, removal or hardware and T3-T9 decompression (re-

operative laminectomies and T3-T5) with multilevel transpedicle/costovertebral 

decompressoin T67, T78, T89, Including complete decompression behind T7, 

osteotomies, and T3-T9 posterior instrumented fusion and deformity correction.








Afebrile, vss. TEENA output 50 overnight approx 30cc serosanguinous drainage in 

bile bag at AM rounds. 


Continues to have abdominal pain and distention despite bowel regimen and BMS








-Abdo xray


-Will d/c drain later today


-Feosol and oxy d/c'ed


-Pain control with Ultram 50mg q4hrs prn pain, Tylenol increased to 1g q6hrs, 

gabapentin 100mg ordered 


-DVT prophylaxis with Heparin sq 5000units q8hrs, scds, ambulation


-Bowel regimen 


-oob, PT


-Incentive spirometry


-VS per routine





above d/w attending Dr Douglas

## 2018-11-20 LAB
ALBUMIN SERPL-MCNC: 3.4 G/DL (ref 3.4–5)
ALP SERPL-CCNC: 87 U/L (ref 45–117)
ALT SERPL-CCNC: 48 U/L (ref 13–61)
ANION GAP SERPL CALC-SCNC: 11 MMOL/L (ref 8–16)
AST SERPL-CCNC: 29 U/L (ref 15–37)
BASOPHILS # BLD: 0.6 % (ref 0–2)
BILIRUB SERPL-MCNC: 0.5 MG/DL (ref 0.2–1)
BUN SERPL-MCNC: 19 MG/DL (ref 7–18)
CALCIUM SERPL-MCNC: 8.4 MG/DL (ref 8.5–10.1)
CHLORIDE SERPL-SCNC: 102 MMOL/L (ref 98–107)
CO2 SERPL-SCNC: 26 MMOL/L (ref 21–32)
CREAT SERPL-MCNC: 0.9 MG/DL (ref 0.55–1.3)
DEPRECATED RDW RBC AUTO: 13.8 % (ref 11.9–15.9)
EOSINOPHIL # BLD: 1.9 % (ref 0–4.5)
GLUCOSE SERPL-MCNC: 83 MG/DL (ref 74–106)
HCT VFR BLD CALC: 32.4 % (ref 35.4–49)
HGB BLD-MCNC: 11.2 GM/DL (ref 11.7–16.9)
LYMPHOCYTES # BLD: 27.3 % (ref 8–40)
MAGNESIUM SERPL-MCNC: 2.6 MG/DL (ref 1.8–2.4)
MCH RBC QN AUTO: 31.1 PG (ref 25.7–33.7)
MCHC RBC AUTO-ENTMCNC: 34.6 G/DL (ref 32–35.9)
MCV RBC: 89.7 FL (ref 80–96)
MONOCYTES # BLD AUTO: 6.4 % (ref 3.8–10.2)
NEUTROPHILS # BLD: 63.8 % (ref 42.8–82.8)
PLATELET # BLD AUTO: 267 K/MM3 (ref 134–434)
PMV BLD: 9.2 FL (ref 7.5–11.1)
POTASSIUM SERPLBLD-SCNC: 4.2 MMOL/L (ref 3.5–5.1)
PROT SERPL-MCNC: 7 G/DL (ref 6.4–8.2)
RBC # BLD AUTO: 3.61 M/MM3 (ref 4–5.6)
SODIUM SERPL-SCNC: 138 MMOL/L (ref 136–145)
WBC # BLD AUTO: 5.4 K/MM3 (ref 4–10)

## 2018-11-20 RX ADMIN — SENNOSIDES SCH TAB: 8.6 TABLET, FILM COATED ORAL at 21:26

## 2018-11-20 RX ADMIN — DOCUSATE SODIUM SCH MG: 100 CAPSULE, LIQUID FILLED ORAL at 14:26

## 2018-11-20 RX ADMIN — HEPARIN SODIUM SCH UNIT: 5000 INJECTION, SOLUTION INTRAVENOUS; SUBCUTANEOUS at 21:26

## 2018-11-20 RX ADMIN — HEPARIN SODIUM SCH UNIT: 5000 INJECTION, SOLUTION INTRAVENOUS; SUBCUTANEOUS at 05:51

## 2018-11-20 RX ADMIN — Medication PRN MG: at 01:00

## 2018-11-20 RX ADMIN — HEPARIN SODIUM SCH UNIT: 5000 INJECTION, SOLUTION INTRAVENOUS; SUBCUTANEOUS at 14:27

## 2018-11-20 RX ADMIN — SODIUM CHLORIDE SCH MLS/HR: 9 INJECTION, SOLUTION INTRAVENOUS at 22:13

## 2018-11-20 RX ADMIN — LIDOCAINE SCH PATCH: 50 PATCH TOPICAL at 09:40

## 2018-11-20 RX ADMIN — GABAPENTIN SCH MG: 100 CAPSULE ORAL at 09:39

## 2018-11-20 RX ADMIN — SODIUM CHLORIDE, POTASSIUM CHLORIDE, SODIUM LACTATE AND CALCIUM CHLORIDE SCH: 600; 310; 30; 20 INJECTION, SOLUTION INTRAVENOUS at 17:29

## 2018-11-20 RX ADMIN — DOCUSATE SODIUM SCH MG: 100 CAPSULE, LIQUID FILLED ORAL at 21:26

## 2018-11-20 RX ADMIN — POLYETHYLENE GLYCOL 3350 SCH: 17 POWDER, FOR SOLUTION ORAL at 09:39

## 2018-11-20 RX ADMIN — Medication SCH: at 21:27

## 2018-11-20 RX ADMIN — DOCUSATE SODIUM SCH MG: 100 CAPSULE, LIQUID FILLED ORAL at 05:51

## 2018-11-20 NOTE — PN
Progress Note (short form)





- Note


Progress Note: 





Patient is able to pass gas but unable to go for #2, c/o having LUQ pain. and 

asking for food.








 Vital Signs











Temperature  97.9 F   11/20/18 05:00


 


Pulse Rate  66   11/20/18 05:00


 


Respiratory Rate  20   11/20/18 05:00


 


Blood Pressure  100/52 L  11/20/18 05:00


 


O2 Sat by Pulse Oximetry (%)  100   11/19/18 20:27








GENERAL: awake, alert, in no acute distress.


HEAD: Normal with no signs of trauma.


EYES: PERRL, extraocular movements intact, sclera anicteric, conjunctiva clear. 


ENT: Ears normal,  oropharynx clear without exudates, moist mucous membranes.


NECK: Trachea midline, full range of motion, supple. 


LUNGS: Breath sounds equal, clear to auscultation bilaterally


HEART: Regular rate and rhythm


ABDOMEN: soft, mildly tender to touch, no nausea or vomiting. abdominal xray as 

noted above.  NPO


NEUROLOGICAL:  Normal speech, gait not observed.


PSYCH: Normal mood, normal affect.


 CBCD











WBC  5.1 K/mm3 (4.0-10.0)   11/19/18  11:23    


 


RBC  3.66 M/mm3 (4.00-5.60)  L  11/19/18  11:23    


 


Hgb  11.3 GM/dL (11.7-16.9)  L  11/19/18  11:23    


 


Hct  32.4 % (35.4-49)  L  11/19/18  11:23    


 


MCV  88.3 fl (80-96)   11/19/18  11:23    


 


MCHC  34.9 g/dl (32.0-35.9)   11/19/18  11:23    


 


RDW  14.0 % (11.9-15.9)   11/19/18  11:23    


 


Plt Count  263 K/MM3 (134-434)  D 11/19/18  11:23    


 


MPV  8.8 fl (7.5-11.1)   11/19/18  11:23    








 CMP











Sodium  138 mmol/L (136-145)   11/19/18  11:23    


 


Potassium  4.3 mmol/L (3.5-5.1)   11/19/18  11:23    


 


Chloride  102 mmol/L ()   11/19/18  11:23    


 


Carbon Dioxide  28 mmol/L (21-32)   11/19/18  11:23    


 


Anion Gap  8 MMOL/L (8-16)   11/19/18  11:23    


 


BUN  15 mg/dL (7-18)   11/19/18  11:23    


 


Creatinine  0.9 mg/dL (0.55-1.3)   11/19/18  11:23    


 


Creat Clearance w eGFR  > 60  (>60)   11/19/18  11:23    


 


Random Glucose  96 mg/dL ()   11/19/18  11:23    


 


Calcium  8.6 mg/dL (8.5-10.1)   11/19/18  11:23    


 


Total Bilirubin  0.4 mg/dL (0.2-1)   11/19/18  11:23    


 


AST  34 U/L (15-37)   11/19/18  11:23    


 


ALT  49 U/L (13-61)   11/19/18  11:23    


 


Alkaline Phosphatase  87 U/L ()   11/19/18  11:23    


 


Total Protein  7.2 g/dl (6.4-8.2)   11/19/18  11:23    


 


Albumin  3.5 g/dl (3.4-5.0)   11/19/18  11:23    








 Current Medications











Generic Name Dose Route Start Last Admin





  Trade Name Jovanyq  PRN Reason Stop Dose Admin


 


Acetaminophen  1,000 mg  11/19/18 09:47  





  Tylenol -  PO   





  Q6H PRN   





  PAIN OR FEVER   





     





     





     


 


Benzocaine/Menthol  1 each  11/17/18 18:53  





  Cepacol Lozenge -  MM   





  PRN PRN   





  SORE THROAT   





     





     





     


 


Dexamethasone Sodium Phosphate  4 mg  11/13/18 16:21  





  Decadron Injection -  IVPUSH   





  ONCE PRN   





  NAUSEA AND/OR VOMITING   





     





     





     


 


Diphenhydramine HCl  25 mg  11/13/18 16:33  





  Benadryl -  PO   





  Q6H PRN   





  FOR ITCHING   





     





     





     


 


Docusate Sodium  100 mg  11/13/18 22:00  11/20/18 05:51





  Colace -  PO   100 mg





  TID RENATO   Administration





     





     





     





     


 


Gabapentin  100 mg  11/20/18 10:00  





  Neurontin -  PO   





  DAILY RENATO   





     





     





     





     


 


Heparin Sodium (Porcine)  5,000 unit  11/13/18 22:00  11/20/18 05:51





  Heparin -  SQ   5,000 unit





  TID RENATO   Administration





     





     





     





     


 


Lactated Ringer's  1,000 ml in 1,000 mls @ 125 mls/hr  11/13/18 16:45  11/18/18 

21:21





  Lactated Ringers Solution  IV   Not Given





  ASDIR RENATO   





     





     





     





     


 


Sodium Chloride  1,000 mls @ 75 mls/hr  11/19/18 16:15  11/19/18 16:55





  Normal Saline -  IV   75 mls/hr





  ASDIR RENATO   Administration





     





     





     





     


 


Lidocaine  2 patch  11/17/18 08:50  11/19/18 11:14





  Lidoderm Patch -  TP   Not Given





  DAILY RENATO   





     





     





     





     


 


Melatonin  5 mg  11/17/18 08:49  11/20/18 01:00





  Melatonin  PO   5 mg





  HS PRN   Administration





  INSOMNIA   





     





     





     


 


Miscellaneous  1 each  11/14/18 22:00  11/19/18 21:29





  Lidoderm Patch Removal  MC   Not Given





  DAILY@2200 RENATO   





     





     





     





     


 


Ondansetron HCl  4 mg  11/13/18 16:33  11/18/18 08:58





  Zofran Injection  IVPUSH   4 mg





  Q6H PRN   Administration





  NAUSEA   





     





     





     


 


Polyethylene Glycol  17 gm  11/14/18 10:00  11/19/18 11:15





  Miralax (For Daily Use) -  PO   17 gm





  DAILY RENATO   Administration





     





     





     





     


 


Promethazine HCl  12.5 mg  11/13/18 16:21  





  Phenergan Injection -  IVPB   





  Q6H PRN   





  NAUSEA AND/OR VOMITING   





     





     





     


 


Senna  2 tab  11/17/18 22:00  11/19/18 21:29





  Senna -  PO   2 tab





  HS RENATO   Administration





     





     





     





     


 


Simethicone  80 mg  11/14/18 08:48  11/15/18 22:23





  Mylicon -  PO   80 mg





  Q4H PRN   Administration





  GAS   





     





     





     


 


Tramadol HCl  50 mg  11/19/18 09:46  11/20/18 00:59





  Ultram -  PO   50 mg





  Q4H PRN   Administration





  PAIN LEVEL 1-5   





     





     





     








 Home Medications











 Medication  Instructions  Recorded


 


NK [No Known Home Medication]  11/08/18








ASSESSMENT/PLAN:


Patient is a 48 year old male with a past medical history of chronic 

progressive back pain presented for elective surgery .





# POD #7 of exploration of spinal fusion, removal or hardware and T3-T9 

decompression (re-operative laminectomies and T3-T5) with multilevel 

transpedicle/costovertebral decompressoin T67, T78, T89, complete decompression 

behind T7, osteotomies, and T3-T9 posterior instrumented fusion and deformity 

correction.





# Acute constipation on pain meds, on Miralax , colace 100mg po tid, with air 

fluid level on the abdominal xray ; will repeat the xray for resolution , since 

the patient is nPO, on ivf, will repeat the xray of the abdomen for follow up 

since was found to have an abdominal xray shows air fluid levels seen both in 

the colon and in the small bowel.  pt has seen by surgery (Dr. Crouch). made npo 

except for meds. Patient encouraged to increase ambulation which will help 

abdominal pain and constipation. Incentive spirometer q1 hours encouraged, 

physical therapy





# Right shoulder pain, improved, on lidoderm applied to bilateral shoulders for 

discomfort. 





continue npo, ivf





DVT px: SCDs





repeat abdominal xray.





Visit type





- Emergency Visit


Emergency Visit: Yes


ED Registration Date: 11/13/18


Care time: The patient presented to the Emergency Department on the above date 

and was hospitalized for further evaluation of their emergent condition.





- New Patient


This patient is new to me today: Yes


Date on this admission: 11/20/18





- Critical Care


Critical Care patient: No





- Discharge Referral


Referred to [facility] No

## 2018-11-20 NOTE — PN
Progress Note (short form)





- Note


Progress Note: 











POD 6, s/p T3-T9  Exploration of spinal fusion, removal or hardware and T3-T9 

decompression (re-operative laminectomies and T3-T5) with multilevel 

transpedicle/costovertebral decompressoin T67, T78, T89, Including complete 

decompression behind T7, osteotomies, and T3-T9 posterior instrumented fusion 

and deformity correction











Pt seen and examined. Continues to have significant abdominal pain, reports 

currently 7/10. No BMs yesterday. Has been NPO per gen surg reccs. Reports 

passing flatus with minimal improvement in pain. Has been voiding without 

issue. OOB yesterday minimally due to back pain. Reports burning in his rle has 

slightly improved. Denies cp, sob, n/v/d, calf pain or edema.








 Vital Signs











Temp  97.9 F   11/20/18 05:00


 


Pulse  66   11/20/18 05:00


 


Resp  20   11/20/18 05:00


 


BP  100/52 L  11/20/18 05:00


 


Pulse Ox  100   11/19/18 20:27








 Intake & Output











 11/19/18 11/19/18 11/20/18





 11:59 23:59 11:59


 


Intake Total  375 855


 


Output Total  100 40


 


Balance  275 815


 


Intake:   


 


  IV  75 825


 


    Normal Saline - 1,000 ml  75 825





    @ 75 mls/hr IV ASDIR RENATO   





    Rx#:[id number]   


 


  Oral  300 30


 


Output:   


 


  Drainage  100 40


 


    Medial Back  100 40


 


Other:   


 


  Voiding Method Toilet Toilet 


 


  # Unmeasured Voids   


 


    Void 2  3


 


  Bowel Movement  No 

















 CBC, BMP





 11/19/18 11:23 





 11/19/18 11:23 

















Gen: awake, alert, nad 


Resp: cta b/l


CV: rrr, s1s2


Abdomen: distended but soft, + ttp in all quadrants, hypoactive bowel sounds.


Back: upper back dressing c/d/i, no strikethrough noted. Drain to bile bag with 

approx 5cc serosanguinous drainage in bag. 


UE/LE's: b/l ue's 5/5 biceps/triceps/deltoids. b/l le's 5/5 dorsiflexion/

plantar flexion hip flexion/extension. SILT bilateral lower ext and upper ext.











A/P:  49 y/o M w/ Chronic progressive back pain x 10 years now s/p thoracic (T3-

T5) and lumbar (L2-L5) fusions @ 8 years ago at OSH, now POD 6, s/p T3-T9  

Exploration of spinal fusion, removal or hardware and T3-T9 decompression (re-

operative laminectomies and T3-T5) with multilevel transpedicle/costovertebral 

decompressoin T67, T78, T89, Including complete decompression behind T7, 

osteotomies, and T3-T9 posterior instrumented fusion and deformity correction.








Afebrile, vss. TEENA output 40 overnight approx 5cc serosanguinous drainage in 

bile bag at AM rounds. 


Continues to have abdominal pain and distention despite bowel regimen and BMS


Abdo xray done yesterday : air fluid levels in colon and small bowel, no free 

air, slightly less distended than prior imaging 











-Will d/c drain later today


-Pain control with Ultram 50mg q4hrs prn pain, Tylenol increased to 1g q6hrs, 

gabapentin 100mg qd (may increase as needed)


-DVT prophylaxis with Heparin sq 5000units q8hrs, scds, ambulation


-Bowel regimen 


-oob, PT


-Incentive spirometry


-VS per routine


-Appreciate gen surgery reccs








above d/w attending Dr Douglas

## 2018-11-21 VITALS — HEART RATE: 73 BPM | SYSTOLIC BLOOD PRESSURE: 124 MMHG | DIASTOLIC BLOOD PRESSURE: 75 MMHG | TEMPERATURE: 99 F

## 2018-11-21 RX ADMIN — HEPARIN SODIUM SCH UNIT: 5000 INJECTION, SOLUTION INTRAVENOUS; SUBCUTANEOUS at 05:44

## 2018-11-21 RX ADMIN — DOCUSATE SODIUM SCH MG: 100 CAPSULE, LIQUID FILLED ORAL at 05:44

## 2018-11-21 RX ADMIN — POLYETHYLENE GLYCOL 3350 SCH GM: 17 POWDER, FOR SOLUTION ORAL at 09:46

## 2018-11-21 RX ADMIN — SODIUM CHLORIDE SCH MLS/HR: 9 INJECTION, SOLUTION INTRAVENOUS at 11:35

## 2018-11-21 RX ADMIN — GABAPENTIN SCH MG: 100 CAPSULE ORAL at 09:47

## 2018-11-21 RX ADMIN — LIDOCAINE SCH PATCH: 50 PATCH TOPICAL at 09:47

## 2018-11-21 RX ADMIN — DOCUSATE SODIUM SCH MG: 100 CAPSULE, LIQUID FILLED ORAL at 13:39

## 2018-11-21 RX ADMIN — HEPARIN SODIUM SCH UNIT: 5000 INJECTION, SOLUTION INTRAVENOUS; SUBCUTANEOUS at 13:40

## 2018-11-21 NOTE — DS
Physical Examination


Vital Signs: 


 Vital Signs











Temperature  98 F   11/21/18 08:05


 


Pulse Rate  71   11/21/18 08:05


 


Respiratory Rate  18   11/21/18 08:05


 


Blood Pressure  113/88   11/21/18 08:05


 


O2 Sat by Pulse Oximetry (%)  100   11/20/18 20:47











...Rectal Exam: Yes: Deferred


Musculoskeletal: Yes: Other (+TLSO Brace)


Edema: No


Wound/Incision: Yes: Dressing Dry and Intact


Labs: 


 CBC, BMP





 11/20/18 10:35 





 11/20/18 10:35 











Discharge Summary


Reason For Visit: THORACIC SPONDYLOSIS,KYPHOSIS


Current Active Problems





Abdominal pain in male (Acute)


Chronic back pain greater than 3 months duration (Acute)


Constipation due to opioid therapy (Acute)


Thoracic spondylosis (Acute)








Hospital Course: 





48 year old male with a history of chronic progressive back pain, admitted to 

[facility] from 11/13/2018 - 11/21/18 for elective exploration of spinal fusion, 

removal or hardware and T3-T9 decompression (re-operative laminectomies and T3-

T5) with multilevel transpedicle/costovertebral decompressoin T67, T78, T89, 

complete decompression behind T7, osteotomies, and T3-T9 posterior instrumented 

fusion and deformity correction by Dr. Douglas. 





Posterior lumbar drain removed POD 3. Post op course notable for constipation 

and abdominal pain. Abdominal xrays showed air fluid levels seen both in the 

colon and in the small bowel. He was made npo except for medications. Repeat x-

ays were not greatly changed. Pneumatosis or free air not seen. Patient given a 

meal of clears which he tolerated well.  He had one bowel movement during his 

stay and passed flatus. He ambulated with PT. 





Patient discharged with Ultram 50mg q4hrs prn pain and Gabapentin 100mg qd for 

pain. F/u with Dr. Marin's office 





Condition: Stable





- Instructions


Diet, Activity, Other Instructions: 


Post Operative Instructions





Physical Activity


Resume your normal everyday activity as tolerated. No heavy lifting or exercise 

until seen by your surgeon. You may walk 


unlimited amounts and climb stairs. You may resume driving the car when you 

feel safe and comfortable behind the


wheel and you are no longer wearing your brace. Do not operate a vehicle while 

taking narcotic medication.





Brace 


If you had back surgery, wear TLSO Brace whenever out of bed. May remove to 

sleep and shower.








Wound Care


Keep your incision clean, dry and covered at all times. Apply an occlusive 

dressing (Saran wrap or Tegaderm) when showering


to avoid getting your incision wet. Do not submerge incision or apply ointments 

or creams.


The staples will be removed in the office in 10-14 days post-op.





Diet


There are no dietary restrictions. Eat healthy, high-fiber foods. Drink 6-8 

glasses of liquid each day. This will assist


in keeping your bowels regular.





Pain Management


You may take Tylenol or acetaminophen. Any pain prescription medication ordered 

should be taken as prescribed for moderate to


severe pain.





Call Dr Mcgarry for any of the following:


Severe pain not relieved by medication


Fever of 101 or higher


Excessive bleeding or drainage on dressing


Inability to urinate





Any chest pain or shortness of breath, seek Emergency Care.





Call the office to confirm a post-operative appointment for 2-3 weeks post-op





Rios Douglas MD


[facility]


[address] [place] [address] Floor


[place] [address]


[phone]





Disposition: HOME





- Home Medications


Comprehensive Discharge Medication List: 


Ambulatory Orders





Gabapentin 100 mg PO QID #120 capsule 11/21/18 


Tramadol HCl [Ultram] 50 mg PO Q4H #60 tablet MDD 6 11/21/18 








This patient is new to me today: Yes


Date on this admission: 11/21/18


Emergency Visit: No


Critical Care patient: No





- Discharge Referral


Referred to [facility] No

## 2018-11-21 NOTE — PN
Progress Note (short form)





- Note


Progress Note: 





POD #6


s/p Exploration of spinal fusion, removal or hardware and T3-T9 decompression (

re-operative laminectomies and T3-T5) with multilevel transpedicle/

costovertebral decompressoin T67, T78, T89, Including complete decompression 

behind T7, osteotomies, and T3-T9 posterior instrumented fusion and deformity 

correction





Doing well. States he wants to go home. ABD discomfort greatly diminished 

compared to previous exams. Continues to pass flatus and BMs Ambulating. 

Voiding. RLE "burning sensation" greatly improved. Denies n/v/f/c, CP, 

palpitations, SOB, DILLARD, numbness/tingling/weakness to his extremities.








 Last Vital Signs











Temp Pulse Resp BP Pulse Ox


 


 98 F   71   18   113/88   100 


 


 11/21/18 08:05  11/21/18 08:05  11/21/18 08:05  11/21/18 08:05  11/20/18 20:47








Gen: nad


ABD: softly distended. + bowel sounds


Back: dressing c/d/i.


Neuro: GMNVI bilat





Problem List





- Problems


(1) Chronic back pain greater than 3 months duration


Assessment/Plan: 


POD #6





Patient recovering s/p spine surgery. States he WANTS to go home today. Dr. Douglas saw/examined patient this morning and said he is comfortable with 

patient's choice for departure today.


-Pain control


   1. Ultram 50mg q4hrs prn pain


   2. Gabapentin 100mg qd


f/u instructions in dc planning section





Code(s): M54.9 - DORSALGIA, UNSPECIFIED; G89.29 - OTHER CHRONIC PAIN   





(2) Constipation due to opioid therapy


Code(s): K59.03 - DRUG INDUCED CONSTIPATION; T40.2X5A - ADVERSE EFFECT OF OTHER 

OPIOIDS, INITIAL ENCOUNTER